# Patient Record
Sex: FEMALE | Race: WHITE | Employment: FULL TIME | URBAN - METROPOLITAN AREA
[De-identification: names, ages, dates, MRNs, and addresses within clinical notes are randomized per-mention and may not be internally consistent; named-entity substitution may affect disease eponyms.]

---

## 2017-09-26 ENCOUNTER — HOSPITAL ENCOUNTER (OUTPATIENT)
Dept: MAMMOGRAPHY | Age: 47
Discharge: HOME | End: 2017-09-26
Attending: NURSE PRACTITIONER | Admitting: NURSE PRACTITIONER

## 2017-09-26 DIAGNOSIS — Z12.31 ENCOUNTER FOR SCREENING MAMMOGRAM FOR MALIGNANT NEOPLASM OF BREAST: ICD-10-CM

## 2019-01-15 ENCOUNTER — HOSPITAL ENCOUNTER (OUTPATIENT)
Dept: WOMENS IMAGING | Age: 49
Discharge: HOME | End: 2019-01-15
Payer: COMMERCIAL

## 2019-01-15 DIAGNOSIS — Z12.31 VISIT FOR SCREENING MAMMOGRAM: ICD-10-CM

## 2019-01-15 PROCEDURE — 77067 SCR MAMMO BI INCL CAD: CPT

## 2019-05-28 ENCOUNTER — OFFICE VISIT (OUTPATIENT)
Dept: PRIMARY CARE CLINIC | Age: 49
End: 2019-05-28
Payer: COMMERCIAL

## 2019-05-28 VITALS
OXYGEN SATURATION: 98 % | BODY MASS INDEX: 29.89 KG/M2 | HEIGHT: 66 IN | HEART RATE: 66 BPM | SYSTOLIC BLOOD PRESSURE: 116 MMHG | WEIGHT: 186 LBS | DIASTOLIC BLOOD PRESSURE: 76 MMHG

## 2019-05-28 DIAGNOSIS — B00.1 HERPES LABIALIS WITHOUT COMPLICATION: Primary | ICD-10-CM

## 2019-05-28 PROCEDURE — 99213 OFFICE O/P EST LOW 20 MIN: CPT | Performed by: NURSE PRACTITIONER

## 2019-05-28 RX ORDER — VALACYCLOVIR HYDROCHLORIDE 1 G/1
2000 TABLET, FILM COATED ORAL 2 TIMES DAILY
Qty: 8 TABLET | Refills: 5 | Status: SHIPPED | OUTPATIENT
Start: 2019-05-28 | End: 2019-05-30

## 2019-05-28 ASSESSMENT — PATIENT HEALTH QUESTIONNAIRE - PHQ9
SUM OF ALL RESPONSES TO PHQ QUESTIONS 1-9: 0
SUM OF ALL RESPONSES TO PHQ9 QUESTIONS 1 & 2: 0
SUM OF ALL RESPONSES TO PHQ QUESTIONS 1-9: 0
2. FEELING DOWN, DEPRESSED OR HOPELESS: 0
1. LITTLE INTEREST OR PLEASURE IN DOING THINGS: 0

## 2019-05-28 NOTE — PROGRESS NOTES
Subjective:      Patient ID: Sly Cain is a 54 y.o. female. HPI 55 yo female presents to clinic for c/o frequent cold sores. Has previously taken lysine for this plus abreva and it would go away. Current symptoms have been going on for weeks. Typical treatment not working. Very dry. Tingling. Symptoms seem like they are improving then they return. Never has fully improved. Patient past medical history, family history, social, and smoking reviewed and updated as pertinent. Health maintenance has been reviewed. Prior to Visit Medications    Medication Sig Taking? Authorizing Provider   valACYclovir (VALTREX) 1 g tablet Take 2 tablets by mouth 2 times daily for 2 days Yes Mushtaq Langley, APRN - CNP   Glucosamine-Chondroit-Vit C-Mn (GLUCOSAMINE 1500 COMPLEX) CAPS Take 1 tablet by mouth Yes Historical Provider, MD   ascorbic acid (VITAMIN C) 500 MG tablet Take 500 mg by mouth daily Yes Historical Provider, MD   MAGNESIUM PO Take  by mouth daily. Yes Historical Provider, MD   Omega-3 Fatty Acids (FISH OIL PO) Take  by mouth daily. Yes Historical Provider, MD   Multiple Vitamins-Minerals (ZINC PO) Take  by mouth daily. Yes Historical Provider, MD   Multiple Vitamin (MULTIVITAMIN PO) Take  by mouth daily. Yes Historical Provider, MD   LYSINE PO Take  by mouth daily. Yes Historical Provider, MD         Review of Systems   Constitutional: Negative. HENT: Positive for mouth sores. All other systems reviewed and are negative. Objective:   Physical Exam   Constitutional: She appears well-developed and well-nourished. HENT:   Right Ear: External ear normal.   Left Ear: External ear normal.   Mouth/Throat: Oropharynx is clear and moist. Oral lesions present. No oropharyngeal exudate. Cardiovascular: Normal rate, regular rhythm and normal heart sounds. Pulmonary/Chest: Effort normal and breath sounds normal.   Skin: Skin is warm and dry. Assessment:       Diagnosis Orders   1.  Herpes labialis without complication             Plan:       Continue current therapy  Treat valtrex when flares  rtc to est new pcp        Gagan Lesch, APRN - CNP

## 2019-06-27 ENCOUNTER — OFFICE VISIT (OUTPATIENT)
Dept: PRIMARY CARE CLINIC | Age: 49
End: 2019-06-27
Payer: COMMERCIAL

## 2019-06-27 VITALS
WEIGHT: 189 LBS | SYSTOLIC BLOOD PRESSURE: 102 MMHG | RESPIRATION RATE: 16 BRPM | HEIGHT: 66 IN | HEART RATE: 72 BPM | OXYGEN SATURATION: 98 % | DIASTOLIC BLOOD PRESSURE: 70 MMHG | BODY MASS INDEX: 30.37 KG/M2

## 2019-06-27 DIAGNOSIS — Z00.00 ROUTINE GENERAL MEDICAL EXAMINATION AT A HEALTH CARE FACILITY: Primary | ICD-10-CM

## 2019-06-27 DIAGNOSIS — Z12.83 SKIN EXAM, SCREENING FOR CANCER: ICD-10-CM

## 2019-06-27 LAB
ANION GAP SERPL CALCULATED.3IONS-SCNC: 12 MMOL/L (ref 3–16)
BASOPHILS ABSOLUTE: 0 K/UL (ref 0–0.2)
BASOPHILS RELATIVE PERCENT: 0.8 %
BUN BLDV-MCNC: 12 MG/DL (ref 7–20)
CALCIUM SERPL-MCNC: 9 MG/DL (ref 8.3–10.6)
CHLORIDE BLD-SCNC: 105 MMOL/L (ref 99–110)
CHOLESTEROL, TOTAL: 217 MG/DL (ref 0–199)
CO2: 22 MMOL/L (ref 21–32)
CREAT SERPL-MCNC: 0.7 MG/DL (ref 0.6–1.1)
EOSINOPHILS ABSOLUTE: 0.2 K/UL (ref 0–0.6)
EOSINOPHILS RELATIVE PERCENT: 3.4 %
GFR AFRICAN AMERICAN: >60
GFR NON-AFRICAN AMERICAN: >60
GLUCOSE BLD-MCNC: 97 MG/DL (ref 70–99)
HCT VFR BLD CALC: 36 % (ref 36–48)
HDLC SERPL-MCNC: 92 MG/DL (ref 40–60)
HEMOGLOBIN: 11.9 G/DL (ref 12–16)
LDL CHOLESTEROL CALCULATED: 114 MG/DL
LYMPHOCYTES ABSOLUTE: 1.6 K/UL (ref 1–5.1)
LYMPHOCYTES RELATIVE PERCENT: 28.7 %
MCH RBC QN AUTO: 29.2 PG (ref 26–34)
MCHC RBC AUTO-ENTMCNC: 32.9 G/DL (ref 31–36)
MCV RBC AUTO: 88.7 FL (ref 80–100)
MONOCYTES ABSOLUTE: 0.4 K/UL (ref 0–1.3)
MONOCYTES RELATIVE PERCENT: 6.9 %
NEUTROPHILS ABSOLUTE: 3.3 K/UL (ref 1.7–7.7)
NEUTROPHILS RELATIVE PERCENT: 60.2 %
PDW BLD-RTO: 15.1 % (ref 12.4–15.4)
PLATELET # BLD: 281 K/UL (ref 135–450)
PMV BLD AUTO: 9.2 FL (ref 5–10.5)
POTASSIUM SERPL-SCNC: 4.4 MMOL/L (ref 3.5–5.1)
RBC # BLD: 4.06 M/UL (ref 4–5.2)
SODIUM BLD-SCNC: 139 MMOL/L (ref 136–145)
TRIGL SERPL-MCNC: 53 MG/DL (ref 0–150)
TSH REFLEX: 2.57 UIU/ML (ref 0.27–4.2)
VLDLC SERPL CALC-MCNC: 11 MG/DL
WBC # BLD: 5.5 K/UL (ref 4–11)

## 2019-06-27 PROCEDURE — 99396 PREV VISIT EST AGE 40-64: CPT | Performed by: NURSE PRACTITIONER

## 2019-06-27 NOTE — PROGRESS NOTES
Subjective:      Patient ID: Nissa Shah is a 54 y.o. female. HPI  53 yo female here for annual. Would like referral to derm for skin check    Patient past medical history, family history, social, and smoking reviewed and updated as pertinent. Health maintenance has been reviewed. Prior to Visit Medications    Medication Sig Taking? Authorizing Provider   Glucosamine-Chondroit-Vit C-Mn (GLUCOSAMINE 1500 COMPLEX) CAPS Take 1 tablet by mouth Yes Historical Provider, MD   ascorbic acid (VITAMIN C) 500 MG tablet Take 500 mg by mouth daily Yes Historical Provider, MD   MAGNESIUM PO Take  by mouth daily. Yes Historical Provider, MD   Omega-3 Fatty Acids (FISH OIL PO) Take  by mouth daily. Yes Historical Provider, MD   Multiple Vitamins-Minerals (ZINC PO) Take  by mouth daily. Yes Historical Provider, MD   Multiple Vitamin (MULTIVITAMIN PO) Take  by mouth daily. Yes Historical Provider, MD   LYSINE PO Take  by mouth daily. Yes Historical Provider, MD       Review of Systems   Constitutional: Negative. Skin: Negative. All other systems reviewed and are negative. Objective:   Physical Exam   Constitutional: She is oriented to person, place, and time. Vital signs are normal. She appears well-developed and well-nourished. HENT:   Head: Normocephalic. Right Ear: Hearing, tympanic membrane, external ear and ear canal normal.   Left Ear: Hearing, tympanic membrane, external ear and ear canal normal.   Nose: Nose normal. Right sinus exhibits no maxillary sinus tenderness and no frontal sinus tenderness. Left sinus exhibits no maxillary sinus tenderness and no frontal sinus tenderness. Mouth/Throat: Oropharynx is clear and moist and mucous membranes are normal.   Eyes: Pupils are equal, round, and reactive to light. Conjunctivae, EOM and lids are normal.   Neck: Trachea normal, normal range of motion and full passive range of motion without pain.    Cardiovascular: Normal rate, regular rhythm, S1 normal, S2 normal, normal heart sounds, intact distal pulses and normal pulses. Pulmonary/Chest: Effort normal and breath sounds normal.   Abdominal: Soft. Normal appearance and bowel sounds are normal. There is no tenderness. Musculoskeletal: Normal range of motion. Lymphadenopathy:     She has no cervical adenopathy. She has no axillary adenopathy. Neurological: She is alert and oriented to person, place, and time. She has normal strength and normal reflexes. Skin: Skin is warm, dry and intact. Psychiatric: She has a normal mood and affect. Her speech is normal and behavior is normal. Judgment and thought content normal.       Assessment:       Diagnosis Orders   1. Routine general medical examination at a health care facility  Basic Metabolic Panel    CBC Auto Differential    TSH with Reflex    Lipid Panel   2.  Skin exam, screening for cancer  Raffi Conte MD, Dermatology, [facility]           Plan:      Doing well  Labs as above  Refer derm skin check  rtc 1 year cpe        Carmelo Jones, YUMIKO - CNP

## 2020-01-29 ENCOUNTER — OFFICE VISIT (OUTPATIENT)
Dept: PRIMARY CARE CLINIC | Age: 50
End: 2020-01-29
Payer: COMMERCIAL

## 2020-01-29 VITALS
DIASTOLIC BLOOD PRESSURE: 72 MMHG | SYSTOLIC BLOOD PRESSURE: 118 MMHG | BODY MASS INDEX: 32.69 KG/M2 | HEIGHT: 65 IN | TEMPERATURE: 98.5 F | HEART RATE: 64 BPM | WEIGHT: 196.2 LBS

## 2020-01-29 PROCEDURE — 90471 IMMUNIZATION ADMIN: CPT | Performed by: INTERNAL MEDICINE

## 2020-01-29 PROCEDURE — 90715 TDAP VACCINE 7 YRS/> IM: CPT | Performed by: INTERNAL MEDICINE

## 2020-01-29 PROCEDURE — 99386 PREV VISIT NEW AGE 40-64: CPT | Performed by: INTERNAL MEDICINE

## 2020-01-29 ASSESSMENT — PATIENT HEALTH QUESTIONNAIRE - PHQ9
SUM OF ALL RESPONSES TO PHQ QUESTIONS 1-9: 0
SUM OF ALL RESPONSES TO PHQ QUESTIONS 1-9: 0
SUM OF ALL RESPONSES TO PHQ9 QUESTIONS 1 & 2: 0
2. FEELING DOWN, DEPRESSED OR HOPELESS: 0
1. LITTLE INTEREST OR PLEASURE IN DOING THINGS: 0

## 2020-01-29 NOTE — PROGRESS NOTES
Ludmila Conner  YOB: 1970    Date of Service:  1/29/2020    Chief Complaint:   Ludmila Conner is a 54 y.o. female who presents for physical exam      HPI: New patient :patient came here for physical exam and establishing care. Patient is updated on flu vaccine. Review of Systems:  Constitutional: Negative for chills, fever, malaise/fatigue and weight loss. HENT: Negative for headaches, ear discharge, ear pain, hearing loss, sore throat, blurry vision and double vision. Respiratory: Negative for cough,  sputum production, hemoptysis, shortness of breath and wheezing. Cardiovascular: Negative for chest pain, palpitations, orthopnea, claudication and leg swelling. Gastrointestinal: Negative for abdominal pain, nausea and vomiting, constipation, diarrhea, heartburn, blood in stool, melena. Genitourinary: Negative for dysuria, flank pain, frequency, hematuria and urgency. Musculoskeletal: Negative for back pain, myalgias and neck pain. Knee pain. Shoulder pain. Neurological: Negative for dizziness, seizure, sensory change, focal weakness, loss of consciousness . Psychiatric/Behavioral: Negative for depression and substance abuse. The patient does not have insomnia. Skin: Rash, skin lesion, itching, acne, numerous moles. Vitals:    01/29/20 1502   BP: 118/72   Pulse: 64   Temp: 98.5 °F (36.9 °C)     Wt Readings from Last 3 Encounters:   01/29/20 196 lb 3.2 oz (89 kg)   06/27/19 189 lb (85.7 kg)   05/28/19 186 lb (84.4 kg)     BP Readings from Last 3 Encounters:   01/29/20 118/72   06/27/19 102/70   05/28/19 116/76     PHQ Scores 1/29/2020 5/28/2019   PHQ2 Score 0 0   PHQ9 Score 0 0          Physical Exam:  Constitutional:   appears well-nourished. No distress. HENT:   Head: Normocephalic. Right Ear: External ear normal.   Left Ear: External ear normal.   Mouth/Throat: Oropharynx is clear and moist. No oropharyngeal exudate.      Eyes: Conjunctivae and EOM are normal. Pupils are equal, round, and reactive to light. Right eye exhibits no discharge. Left eye exhibits no discharge. No scleral icterus. Neck: Neck supple. No JVD present. No thyromegaly present. Cardiovascular:  Normal rate, regular rhythm, normal heart sounds and intact distal pulses. Exam reveals no gallop. No murmur heard. no edema. Chest: Effort normal and breath sounds normal. no wheezes. has no rales. Abdominal: Soft, Bowel sounds are normal.  exhibits no distension and no mass. No organomegaly  There is no tenderness. There is no guarding. Musculoskeletal: Normal range of motion in all extremities. Lymphadenopathy: No cervical adenopathy. Neurological:  exhibits normal muscle tone. No sensory or motor deficit, Coordination normal, normal reflexes. Skin: Skin is warm. No rash . No Erythema. Psychiatric:  alert and oriented to person, place, and time. Normal mood and affect. Immunization History   Administered Date(s) Administered    Influenza Vaccine, unspecified formulation 09/01/2016    Influenza, Quadv, Recombinant, IM PF (Flublok 18 yrs and older) 10/11/2019    Tdap (Boostrix, Adacel) 11/19/2009, 01/29/2020       No Known Allergies  Current Outpatient Medications   Medication Sig Dispense Refill    Glucosamine-Chondroit-Vit C-Mn (GLUCOSAMINE 1500 COMPLEX) CAPS Take 1 tablet by mouth      ascorbic acid (VITAMIN C) 500 MG tablet Take 500 mg by mouth daily      MAGNESIUM PO Take  by mouth daily.  Omega-3 Fatty Acids (FISH OIL PO) Take  by mouth daily.  Multiple Vitamin (MULTIVITAMIN PO) Take  by mouth daily.  LYSINE PO Take  by mouth daily.  Multiple Vitamins-Minerals (ZINC PO) Take  by mouth daily. No current facility-administered medications for this visit.         Past Medical History:   Diagnosis Date    CTS (carpal tunnel syndrome)     Low back pain     Lumbar strain     Plantar warts      Past Surgical History:   Procedure Laterality Date    BUNIONECTOMY  1/30/12    left    WISDOM TOOTH EXTRACTION       No family history on file. Social History     Socioeconomic History    Marital status:      Spouse name: Not on file    Number of children: Not on file    Years of education: Not on file    Highest education level: Not on file   Occupational History    Not on file   Social Needs    Financial resource strain: Not on file    Food insecurity:     Worry: Not on file     Inability: Not on file    Transportation needs:     Medical: Not on file     Non-medical: Not on file   Tobacco Use    Smoking status: Former Smoker     Packs/day: 0.50     Years: 15.00     Pack years: 7.50     Last attempt to quit: 1/24/2005     Years since quitting: 15.0    Smokeless tobacco: Never Used   Substance and Sexual Activity    Alcohol use: Yes     Comment: 2 X per month    Drug use: No    Sexual activity: Not on file   Lifestyle    Physical activity:     Days per week: Not on file     Minutes per session: Not on file    Stress: Not on file   Relationships    Social connections:     Talks on phone: Not on file     Gets together: Not on file     Attends Protestant service: Not on file     Active member of club or organization: Not on file     Attends meetings of clubs or organizations: Not on file     Relationship status: Not on file    Intimate partner violence:     Fear of current or ex partner: Not on file     Emotionally abused: Not on file     Physically abused: Not on file     Forced sexual activity: Not on file   Other Topics Concern    Not on file   Social History Narrative    Not on file       Assessment/Plan:    1. Physical exam  - Basic Metabolic Panel; Future  - TSH without Reflex; Future  - Vitamin D 25 Hydroxy; Future  - Lipid Panel;  Future  - CBC Auto Differential; Future  Healthy diet and regular exercise      Preventive Care:  PAP: She is due she wants to take on next visit  Colonoscopy : N/A  Vaccine: Patient is updated with flu vaccine  Need for Tdap vaccination  - Tdap (age 6y and older) IM ([address])

## 2020-01-30 DIAGNOSIS — Z00.00 PHYSICAL EXAM: ICD-10-CM

## 2020-01-30 LAB
ANION GAP SERPL CALCULATED.3IONS-SCNC: 12 MMOL/L (ref 3–16)
BASOPHILS ABSOLUTE: 0 K/UL (ref 0–0.2)
BASOPHILS RELATIVE PERCENT: 0.8 %
BUN BLDV-MCNC: 12 MG/DL (ref 7–20)
CALCIUM SERPL-MCNC: 9 MG/DL (ref 8.3–10.6)
CHLORIDE BLD-SCNC: 106 MMOL/L (ref 99–110)
CHOLESTEROL, TOTAL: 206 MG/DL (ref 0–199)
CO2: 24 MMOL/L (ref 21–32)
CREAT SERPL-MCNC: 0.6 MG/DL (ref 0.6–1.1)
EOSINOPHILS ABSOLUTE: 0.2 K/UL (ref 0–0.6)
EOSINOPHILS RELATIVE PERCENT: 3.5 %
GFR AFRICAN AMERICAN: >60
GFR NON-AFRICAN AMERICAN: >60
GLUCOSE BLD-MCNC: 93 MG/DL (ref 70–99)
HCT VFR BLD CALC: 38.1 % (ref 36–48)
HDLC SERPL-MCNC: 86 MG/DL (ref 40–60)
HEMOGLOBIN: 12.8 G/DL (ref 12–16)
LDL CHOLESTEROL CALCULATED: 107 MG/DL
LYMPHOCYTES ABSOLUTE: 1.5 K/UL (ref 1–5.1)
LYMPHOCYTES RELATIVE PERCENT: 26.4 %
MCH RBC QN AUTO: 30.7 PG (ref 26–34)
MCHC RBC AUTO-ENTMCNC: 33.6 G/DL (ref 31–36)
MCV RBC AUTO: 91.4 FL (ref 80–100)
MONOCYTES ABSOLUTE: 0.4 K/UL (ref 0–1.3)
MONOCYTES RELATIVE PERCENT: 7.3 %
NEUTROPHILS ABSOLUTE: 3.4 K/UL (ref 1.7–7.7)
NEUTROPHILS RELATIVE PERCENT: 62 %
PDW BLD-RTO: 14.1 % (ref 12.4–15.4)
PLATELET # BLD: 263 K/UL (ref 135–450)
PMV BLD AUTO: 9.2 FL (ref 5–10.5)
POTASSIUM SERPL-SCNC: 4.4 MMOL/L (ref 3.5–5.1)
RBC # BLD: 4.17 M/UL (ref 4–5.2)
SODIUM BLD-SCNC: 142 MMOL/L (ref 136–145)
TRIGL SERPL-MCNC: 65 MG/DL (ref 0–150)
TSH SERPL DL<=0.05 MIU/L-ACNC: 3.19 UIU/ML (ref 0.27–4.2)
VITAMIN D 25-HYDROXY: 29.4 NG/ML
VLDLC SERPL CALC-MCNC: 13 MG/DL
WBC # BLD: 5.5 K/UL (ref 4–11)

## 2020-10-02 ENCOUNTER — OFFICE VISIT (OUTPATIENT)
Dept: PRIMARY CARE CLINIC | Age: 50
End: 2020-10-02
Payer: COMMERCIAL

## 2020-10-02 VITALS
BODY MASS INDEX: 32.39 KG/M2 | OXYGEN SATURATION: 100 % | TEMPERATURE: 98.2 F | SYSTOLIC BLOOD PRESSURE: 104 MMHG | WEIGHT: 194.4 LBS | DIASTOLIC BLOOD PRESSURE: 58 MMHG | HEIGHT: 65 IN | HEART RATE: 75 BPM

## 2020-10-02 PROCEDURE — 99213 OFFICE O/P EST LOW 20 MIN: CPT | Performed by: FAMILY MEDICINE

## 2020-10-02 ASSESSMENT — ENCOUNTER SYMPTOMS
VOMITING: 0
SHORTNESS OF BREATH: 0
DIARRHEA: 0
ABDOMINAL PAIN: 0
NAUSEA: 0

## 2020-10-02 NOTE — PROGRESS NOTES
[address] PRIMARY CARE  [address] [place] [id number]  Dept: [phone]  Dept Fax: [phone]     10/2/2020      Ziggy Cleaning   1970     Chief Complaint   Patient presents with    Mass     lump under right arm. HPI  Pt comes in today to establish care. She has no PMH. Had a small bump under right axilla that she thinks has since gone away. Last mammo 1/2019. Fasting labs done in 1/2020 normal. Cholesterol mildly elevated, but HDL high. No other acute concerns. UTD on pap. Due 8/2021. UTD on flu vaccine. Prior to Visit Medications    Medication Sig Taking? Authorizing Provider   Glucosamine-Chondroit-Vit C-Mn (GLUCOSAMINE 1500 COMPLEX) CAPS Take 1 tablet by mouth Yes Historical Provider, MD   MAGNESIUM PO Take  by mouth daily. Yes Historical Provider, MD   Multiple Vitamin (MULTIVITAMIN PO) Take  by mouth daily. Yes Historical Provider, MD   LYSINE PO Take  by mouth daily. Yes Historical Provider, MD        Past Medical History:   Diagnosis Date    CTS (carpal tunnel syndrome)     Low back pain         Social History     Tobacco Use    Smoking status: Former Smoker     Packs/day: 0.50     Years: 15.00     Pack years: 7.50     Last attempt to quit: 1/24/2005     Years since quitting: 15.6    Smokeless tobacco: Never Used   Substance Use Topics    Alcohol use: Yes     Comment: 2 X per month    Drug use: No        Past Surgical History:   Procedure Laterality Date    BUNIONECTOMY  1/30/12    left    WISDOM TOOTH EXTRACTION          No Known Allergies     Family History   Problem Relation Age of Onset    High Blood Pressure Mother     High Cholesterol Mother     Cancer Father 68        Lung cancer        Patient's past medical history, surgical history, family history, medications, and allergies  were all reviewed and updated as appropriate today.     Review of Systems   Constitutional: Negative for chills, fever and unexpected weight Assessment:  Encounter Diagnoses   Name Primary?  Axillary lump, right Yes    Encounter for screening mammogram for malignant neoplasm of breast        Plan:    - No palpable lump on exam. Resolved. - Due for screening. Mammo ordered. - Return in 1 year for physical/pap. New Prescriptions    No medications on file        Orders Placed This Encounter   Procedures    CAREY VIC DIGITAL SCREEN BILATERAL        Return in about 1 year (around 10/2/2021) for Physical with pap - fasting labs.           [address], DO

## 2020-10-23 ENCOUNTER — HOSPITAL ENCOUNTER (OUTPATIENT)
Dept: WOMENS IMAGING | Age: 50
Discharge: HOME | End: 2020-10-23
Payer: COMMERCIAL

## 2020-10-23 PROCEDURE — 77063 BREAST TOMOSYNTHESIS BI: CPT

## 2020-10-27 ENCOUNTER — TELEPHONE (OUTPATIENT)
Dept: WOMENS IMAGING | Age: 50
End: 2020-10-27

## 2020-10-30 ENCOUNTER — HOSPITAL ENCOUNTER (OUTPATIENT)
Dept: WOMENS IMAGING | Age: 50
Discharge: HOME | End: 2020-10-30
Payer: COMMERCIAL

## 2020-10-30 ENCOUNTER — HOSPITAL ENCOUNTER (OUTPATIENT)
Dept: WOMENS IMAGING | Age: 50
End: 2020-10-30
Payer: COMMERCIAL

## 2020-10-30 PROCEDURE — G0279 TOMOSYNTHESIS, MAMMO: HCPCS

## 2022-01-04 ENCOUNTER — OFFICE VISIT (OUTPATIENT)
Dept: PRIMARY CARE CLINIC | Age: 52
End: 2022-01-04
Payer: COMMERCIAL

## 2022-01-04 VITALS
BODY MASS INDEX: 32.82 KG/M2 | OXYGEN SATURATION: 97 % | SYSTOLIC BLOOD PRESSURE: 110 MMHG | TEMPERATURE: 97.9 F | WEIGHT: 197 LBS | HEIGHT: 65 IN | DIASTOLIC BLOOD PRESSURE: 73 MMHG | HEART RATE: 75 BPM

## 2022-01-04 DIAGNOSIS — M20.11 HALLUX VALGUS, RIGHT: Primary | ICD-10-CM

## 2022-01-04 DIAGNOSIS — Z12.31 ENCOUNTER FOR SCREENING MAMMOGRAM FOR MALIGNANT NEOPLASM OF BREAST: ICD-10-CM

## 2022-01-04 DIAGNOSIS — Z12.11 SCREENING FOR COLON CANCER: ICD-10-CM

## 2022-01-04 DIAGNOSIS — Z01.818 PRE-OP EXAM: ICD-10-CM

## 2022-01-04 PROCEDURE — 1036F TOBACCO NON-USER: CPT | Performed by: FAMILY MEDICINE

## 2022-01-04 PROCEDURE — G8427 DOCREV CUR MEDS BY ELIG CLIN: HCPCS | Performed by: FAMILY MEDICINE

## 2022-01-04 PROCEDURE — 99213 OFFICE O/P EST LOW 20 MIN: CPT | Performed by: FAMILY MEDICINE

## 2022-01-04 PROCEDURE — G8482 FLU IMMUNIZE ORDER/ADMIN: HCPCS | Performed by: FAMILY MEDICINE

## 2022-01-04 PROCEDURE — G8419 CALC BMI OUT NRM PARAM NOF/U: HCPCS | Performed by: FAMILY MEDICINE

## 2022-01-04 ASSESSMENT — ENCOUNTER SYMPTOMS
ABDOMINAL PAIN: 0
VOMITING: 0
SHORTNESS OF BREATH: 0
NAUSEA: 0
DIARRHEA: 0

## 2022-01-04 ASSESSMENT — PATIENT HEALTH QUESTIONNAIRE - PHQ9
SUM OF ALL RESPONSES TO PHQ QUESTIONS 1-9: 0
2. FEELING DOWN, DEPRESSED OR HOPELESS: 0
1. LITTLE INTEREST OR PLEASURE IN DOING THINGS: 0
SUM OF ALL RESPONSES TO PHQ9 QUESTIONS 1 & 2: 0

## 2022-01-04 NOTE — PROGRESS NOTES
[address] [facility] [address] PRIMARY CARE  [address] [place] [id number]  Dept: [phone]  Dept Fax: [phone]     2022      Dannielle De La Rosa   1970     Chief Complaint   Patient presents with    Pre-op Exam       HPI      Patient is here today for pre-op exam.     Diagnosis: Right hallux valgus    Planned surgery: Bunionectomy    Date scheduled: 21    Anesthesia: General    Blood thinner/ASA/NSAIDs: None    H/o anesthesia complications: None    Pre-operative testing: None ordered from surgeon. H/o cardiac or pulmonary disease: None. Functional capacity: 4-10  ( <4 MET = Self care, ADLs, walking indoors or 1-2 blocks on level ground, 4-10 METs = light/moderate housework, climb flight of stairs, running a short distance, > 10 METs = strenuous sports)        PHQ-9 Total Score: 0 (2022 11:44 AM)       Prior to Visit Medications    Medication Sig Taking? Authorizing Provider   Glucosamine-Chondroit-Vit C-Mn (GLUCOSAMINE 1500 COMPLEX) CAPS Take 1 tablet by mouth Yes Historical Provider, MD   MAGNESIUM PO Take  by mouth daily. Yes Historical Provider, MD   Multiple Vitamin (MULTIVITAMIN PO) Take  by mouth daily. Yes Historical Provider, MD   LYSINE PO Take  by mouth daily.    Yes Historical Provider, MD        Past Medical History:   Diagnosis Date    CTS (carpal tunnel syndrome)     Low back pain         Social History     Tobacco Use    Smoking status: Former Smoker     Packs/day: 0.50     Years: 15.00     Pack years: 7.50     Quit date: 2005     Years since quittin.9    Smokeless tobacco: Never Used   Vaping Use    Vaping Use: Never used   Substance Use Topics    Alcohol use: Yes     Comment: 2 X per month    Drug use: No        Past Surgical History:   Procedure Laterality Date    BUNIONECTOMY  12    left    WISDOM TOOTH EXTRACTION          No Known Allergies     Family History   Problem Relation Age of Onset    High Blood Pressure Mother     High Cholesterol Mother     Cancer Father 68        Lung cancer        Patient's past medical history, surgical history, family history, medications, and allergies  were all reviewed and updated as appropriate today. Review of Systems   Constitutional: Negative for chills, fever and unexpected weight change. Respiratory: Negative for shortness of breath. Cardiovascular: Negative for chest pain. Gastrointestinal: Negative for abdominal pain, diarrhea, nausea and vomiting. /73   Pulse 75   Temp 97.9 °F (36.6 °C)   Ht 5' 5\" (1.651 m)   Wt 197 lb (89.4 kg)   LMP 01/03/2022 (Exact Date)   SpO2 97%   BMI 32.78 kg/m²      Physical Exam  Vitals reviewed. Constitutional:       General: She is not in acute distress. Appearance: Normal appearance. She is well-developed. HENT:      Head: Normocephalic. Eyes:      General: No scleral icterus. Conjunctiva/sclera: Conjunctivae normal.   Neck:      Thyroid: No thyromegaly. Cardiovascular:      Rate and Rhythm: Normal rate and regular rhythm. Heart sounds: No murmur heard. Pulmonary:      Effort: Pulmonary effort is normal. No respiratory distress. Breath sounds: Normal breath sounds. Abdominal:      General: Bowel sounds are normal. There is no distension. Palpations: Abdomen is soft. Tenderness: There is no abdominal tenderness. Musculoskeletal:      Cervical back: Neck supple. Right lower leg: No edema. Left lower leg: No edema. Lymphadenopathy:      Cervical: No cervical adenopathy. Skin:     General: Skin is warm and dry. Capillary Refill: Capillary refill takes less than 2 seconds. Neurological:      General: No focal deficit present. Mental Status: She is alert and oriented to person, place, and time. Mental status is at baseline. Psychiatric:         Mood and Affect: Mood normal.         Assessment:  Encounter Diagnoses   Name Primary?     Hallux biju, right Yes    Pre-op exam     Encounter for screening mammogram for malignant neoplasm of breast     Screening for colon cancer        Plan:    - Low risk to proceed with planned surgery. - Discussed breast/colon cancer screening. Orders provided. New Prescriptions    No medications on file        Orders Placed This Encounter   Procedures   Romi Fernando MD (Colonoscopy), General Surgery, [facility]        Return if symptoms worsen or fail to improve. 20 total minutes were spent in direct patient care including chart review, face-to-face consultation and documentation.      [address], DO

## 2022-01-26 ENCOUNTER — TELEPHONE (OUTPATIENT)
Dept: PRIMARY CARE CLINIC | Age: 52
End: 2022-01-26

## 2022-01-26 NOTE — TELEPHONE ENCOUNTER
----- Message from Amy Ortiz sent at 1/26/2022 10:16 AM EST -----  Subject: Message to Provider    QUESTIONS  Information for Provider? airam--- 60.00 for office visit 40.00 co   pay for office visit talked to billing, billing said to contact office--   patient has Daniel Sinclair  ---------------------------------------------------------------------------  --------------  [address]  What is the best way for the office to contact you? OK to leave message on   voicemail  Preferred Call Back Phone Number? [phone]  ---------------------------------------------------------------------------  --------------  SCRIPT ANSWERS  Relationship to Patient?  Self

## 2022-01-27 ENCOUNTER — TELEPHONE (OUTPATIENT)
Dept: PRIMARY CARE CLINIC | Age: 52
End: 2022-01-27

## 2022-01-30 ENCOUNTER — APPOINTMENT (OUTPATIENT)
Dept: GENERAL RADIOLOGY | Age: 52
DRG: 176 | End: 2022-01-30
Payer: COMMERCIAL

## 2022-01-30 ENCOUNTER — APPOINTMENT (OUTPATIENT)
Dept: CT IMAGING | Age: 52
DRG: 176 | End: 2022-01-30
Payer: COMMERCIAL

## 2022-01-30 ENCOUNTER — HOSPITAL ENCOUNTER (INPATIENT)
Age: 52
LOS: 1 days | Discharge: HOME | DRG: 176 | End: 2022-01-31
Attending: EMERGENCY MEDICINE | Admitting: INTERNAL MEDICINE
Payer: COMMERCIAL

## 2022-01-30 DIAGNOSIS — J96.01 ACUTE RESPIRATORY FAILURE WITH HYPOXIA (HCC): Primary | ICD-10-CM

## 2022-01-30 DIAGNOSIS — I26.94 MULTIPLE SUBSEGMENTAL PULMONARY EMBOLI WITHOUT ACUTE COR PULMONALE (HCC): ICD-10-CM

## 2022-01-30 DIAGNOSIS — R07.9 CHEST PAIN, UNSPECIFIED TYPE: ICD-10-CM

## 2022-01-30 DIAGNOSIS — R06.02 SHORTNESS OF BREATH: ICD-10-CM

## 2022-01-30 DIAGNOSIS — J18.9 PNEUMONIA OF BOTH LUNGS DUE TO INFECTIOUS ORGANISM, UNSPECIFIED PART OF LUNG: ICD-10-CM

## 2022-01-30 PROBLEM — I26.99 BILATERAL PULMONARY EMBOLISM (HCC): Status: ACTIVE | Noted: 2022-01-30

## 2022-01-30 LAB
A/G RATIO: 1.1 (ref 1.1–2.2)
ALBUMIN SERPL-MCNC: 4 G/DL (ref 3.4–5)
ALP BLD-CCNC: 76 U/L (ref 40–129)
ALT SERPL-CCNC: 9 U/L (ref 10–40)
ANION GAP SERPL CALCULATED.3IONS-SCNC: 12 MMOL/L (ref 3–16)
AST SERPL-CCNC: 14 U/L (ref 15–37)
BASOPHILS ABSOLUTE: 0.1 K/UL (ref 0–0.2)
BASOPHILS RELATIVE PERCENT: 0.8 %
BILIRUB SERPL-MCNC: 0.3 MG/DL (ref 0–1)
BUN BLDV-MCNC: 15 MG/DL (ref 7–20)
CALCIUM SERPL-MCNC: 9.4 MG/DL (ref 8.3–10.6)
CHLORIDE BLD-SCNC: 102 MMOL/L (ref 99–110)
CO2: 24 MMOL/L (ref 21–32)
CREAT SERPL-MCNC: 0.7 MG/DL (ref 0.6–1.1)
D DIMER: 1112 NG/ML DDU (ref 0–229)
EOSINOPHILS ABSOLUTE: 0.2 K/UL (ref 0–0.6)
EOSINOPHILS RELATIVE PERCENT: 1.7 %
GFR AFRICAN AMERICAN: >60
GFR NON-AFRICAN AMERICAN: >60
GLUCOSE BLD-MCNC: 167 MG/DL (ref 70–99)
HCG QUALITATIVE: NEGATIVE
HCT VFR BLD CALC: 37.7 % (ref 36–48)
HEMOGLOBIN: 12.6 G/DL (ref 12–16)
LYMPHOCYTES ABSOLUTE: 1.2 K/UL (ref 1–5.1)
LYMPHOCYTES RELATIVE PERCENT: 9.9 %
MCH RBC QN AUTO: 29.7 PG (ref 26–34)
MCHC RBC AUTO-ENTMCNC: 33.5 G/DL (ref 31–36)
MCV RBC AUTO: 88.9 FL (ref 80–100)
MONOCYTES ABSOLUTE: 0.8 K/UL (ref 0–1.3)
MONOCYTES RELATIVE PERCENT: 6.4 %
NEUTROPHILS ABSOLUTE: 9.7 K/UL (ref 1.7–7.7)
NEUTROPHILS RELATIVE PERCENT: 81.2 %
PDW BLD-RTO: 14.3 % (ref 12.4–15.4)
PLATELET # BLD: 272 K/UL (ref 135–450)
PMV BLD AUTO: 8.5 FL (ref 5–10.5)
POTASSIUM REFLEX MAGNESIUM: 3.9 MMOL/L (ref 3.5–5.1)
PROCALCITONIN: 0.13 NG/ML (ref 0–0.15)
RBC # BLD: 4.25 M/UL (ref 4–5.2)
SARS-COV-2, NAAT: NOT DETECTED
SODIUM BLD-SCNC: 138 MMOL/L (ref 136–145)
TOTAL PROTEIN: 7.7 G/DL (ref 6.4–8.2)
TROPONIN: <0.01 NG/ML
WBC # BLD: 11.9 K/UL (ref 4–11)

## 2022-01-30 PROCEDURE — 71046 X-RAY EXAM CHEST 2 VIEWS: CPT

## 2022-01-30 PROCEDURE — 84484 ASSAY OF TROPONIN QUANT: CPT

## 2022-01-30 PROCEDURE — 85379 FIBRIN DEGRADATION QUANT: CPT

## 2022-01-30 PROCEDURE — 36415 COLL VENOUS BLD VENIPUNCTURE: CPT

## 2022-01-30 PROCEDURE — 2580000003 HC RX 258: Performed by: EMERGENCY MEDICINE

## 2022-01-30 PROCEDURE — 87635 SARS-COV-2 COVID-19 AMP PRB: CPT

## 2022-01-30 PROCEDURE — 85025 COMPLETE CBC W/AUTO DIFF WBC: CPT

## 2022-01-30 PROCEDURE — 96372 THER/PROPH/DIAG INJ SC/IM: CPT

## 2022-01-30 PROCEDURE — 80053 COMPREHEN METABOLIC PANEL: CPT

## 2022-01-30 PROCEDURE — 6360000004 HC RX CONTRAST MEDICATION: Performed by: EMERGENCY MEDICINE

## 2022-01-30 PROCEDURE — 84145 PROCALCITONIN (PCT): CPT

## 2022-01-30 PROCEDURE — 93005 ELECTROCARDIOGRAM TRACING: CPT | Performed by: EMERGENCY MEDICINE

## 2022-01-30 PROCEDURE — 2580000003 HC RX 258: Performed by: INTERNAL MEDICINE

## 2022-01-30 PROCEDURE — 6370000000 HC RX 637 (ALT 250 FOR IP): Performed by: INTERNAL MEDICINE

## 2022-01-30 PROCEDURE — 99284 EMERGENCY DEPT VISIT MOD MDM: CPT

## 2022-01-30 PROCEDURE — 2060000000 HC ICU INTERMEDIATE R&B

## 2022-01-30 PROCEDURE — 71260 CT THORAX DX C+: CPT

## 2022-01-30 PROCEDURE — 84703 CHORIONIC GONADOTROPIN ASSAY: CPT

## 2022-01-30 PROCEDURE — 6360000002 HC RX W HCPCS: Performed by: EMERGENCY MEDICINE

## 2022-01-30 RX ORDER — ACETAMINOPHEN 325 MG/1
650 TABLET ORAL EVERY 6 HOURS PRN
Status: DISCONTINUED | OUTPATIENT
Start: 2022-01-30 | End: 2022-01-31 | Stop reason: HOSPADM

## 2022-01-30 RX ORDER — ACETAMINOPHEN 650 MG/1
650 SUPPOSITORY RECTAL EVERY 6 HOURS PRN
Status: DISCONTINUED | OUTPATIENT
Start: 2022-01-30 | End: 2022-01-31 | Stop reason: HOSPADM

## 2022-01-30 RX ORDER — ONDANSETRON 2 MG/ML
4 INJECTION INTRAMUSCULAR; INTRAVENOUS EVERY 6 HOURS PRN
Status: DISCONTINUED | OUTPATIENT
Start: 2022-01-30 | End: 2022-01-31 | Stop reason: HOSPADM

## 2022-01-30 RX ORDER — SODIUM CHLORIDE 0.9 % (FLUSH) 0.9 %
10 SYRINGE (ML) INJECTION PRN
Status: DISCONTINUED | OUTPATIENT
Start: 2022-01-30 | End: 2022-01-31 | Stop reason: HOSPADM

## 2022-01-30 RX ORDER — KETOROLAC TROMETHAMINE 30 MG/ML
30 INJECTION, SOLUTION INTRAMUSCULAR; INTRAVENOUS ONCE
Status: DISCONTINUED | OUTPATIENT
Start: 2022-01-30 | End: 2022-01-30

## 2022-01-30 RX ORDER — BISMUTH SUBSALICYLATE 262 MG/1
524 TABLET, CHEWABLE ORAL
Status: DISCONTINUED | OUTPATIENT
Start: 2022-01-30 | End: 2022-01-31 | Stop reason: HOSPADM

## 2022-01-30 RX ORDER — SENNA AND DOCUSATE SODIUM 50; 8.6 MG/1; MG/1
1 TABLET, FILM COATED ORAL 2 TIMES DAILY
Status: DISCONTINUED | OUTPATIENT
Start: 2022-01-30 | End: 2022-01-31 | Stop reason: HOSPADM

## 2022-01-30 RX ORDER — ONDANSETRON 4 MG/1
4 TABLET, ORALLY DISINTEGRATING ORAL EVERY 8 HOURS PRN
Status: DISCONTINUED | OUTPATIENT
Start: 2022-01-30 | End: 2022-01-31 | Stop reason: HOSPADM

## 2022-01-30 RX ORDER — SODIUM CHLORIDE 0.9 % (FLUSH) 0.9 %
10 SYRINGE (ML) INJECTION EVERY 12 HOURS SCHEDULED
Status: DISCONTINUED | OUTPATIENT
Start: 2022-01-30 | End: 2022-01-31 | Stop reason: HOSPADM

## 2022-01-30 RX ORDER — SODIUM CHLORIDE 9 MG/ML
25 INJECTION, SOLUTION INTRAVENOUS PRN
Status: DISCONTINUED | OUTPATIENT
Start: 2022-01-30 | End: 2022-01-31 | Stop reason: HOSPADM

## 2022-01-30 RX ORDER — SODIUM CHLORIDE 9 MG/ML
1000 INJECTION, SOLUTION INTRAVENOUS CONTINUOUS
Status: DISCONTINUED | OUTPATIENT
Start: 2022-01-30 | End: 2022-01-30

## 2022-01-30 RX ADMIN — SODIUM CHLORIDE 1000 ML: 9 INJECTION, SOLUTION INTRAVENOUS at 06:42

## 2022-01-30 RX ADMIN — IOPAMIDOL 80 ML: 755 INJECTION, SOLUTION INTRAVENOUS at 05:14

## 2022-01-30 RX ADMIN — ACETAMINOPHEN 650 MG: 325 TABLET ORAL at 17:18

## 2022-01-30 RX ADMIN — CEFTRIAXONE 1000 MG: 1 INJECTION, POWDER, FOR SOLUTION INTRAMUSCULAR; INTRAVENOUS at 08:40

## 2022-01-30 RX ADMIN — ENOXAPARIN SODIUM 120 MG: 120 INJECTION SUBCUTANEOUS at 06:40

## 2022-01-30 RX ADMIN — Medication 10 ML: at 20:12

## 2022-01-30 RX ADMIN — AZITHROMYCIN 500 MG: 500 INJECTION, POWDER, LYOPHILIZED, FOR SOLUTION INTRAVENOUS at 06:50

## 2022-01-30 ASSESSMENT — HEART SCORE: ECG: 0

## 2022-01-30 ASSESSMENT — PAIN DESCRIPTION - PAIN TYPE
TYPE: ACUTE PAIN
TYPE: ACUTE PAIN

## 2022-01-30 ASSESSMENT — PAIN DESCRIPTION - LOCATION
LOCATION: BACK;CHEST;RIB CAGE
LOCATION: BACK;CHEST;RIB CAGE

## 2022-01-30 ASSESSMENT — PAIN DESCRIPTION - ORIENTATION
ORIENTATION: LEFT
ORIENTATION: LEFT

## 2022-01-30 ASSESSMENT — PAIN SCALES - GENERAL
PAINLEVEL_OUTOF10: 8
PAINLEVEL_OUTOF10: 7

## 2022-01-30 ASSESSMENT — PAIN DESCRIPTION - DESCRIPTORS
DESCRIPTORS: SHARP
DESCRIPTORS: RADIATING;SHARP

## 2022-01-30 NOTE — PROGRESS NOTES
Arrived from [facility] via stretcher. Alert & oriented. Resp easy/even on room air. Denies pain/discomfort. Oriented to room and call light system.  Dr sher for orders

## 2022-01-30 NOTE — PLAN OF CARE
Problem: Gas Exchange - Impaired:  Goal: Levels of oxygenation will improve  Description: Levels of oxygenation will improve  Outcome: Ongoing   Resp easy/even this shift. Slight dyspnea on excertion. O2 sat 94% on room air.  Continue to monitor

## 2022-01-30 NOTE — H&P
Hospital Medicine History & Physical      PCP: Kristine Rose DO    Date of Admission: 1/30/2022    Date of Service: 1/30/2022    Pt seen/examined on 1/30/2022    Admitted to Inpatient with expected LOS greater than two midnights due to medical therapy. Chief Complaint:     Chief Complaint   Patient presents with    Shortness of Breath     Pt c/o Left sided back and shoulder pain this past week that has gotten worse and is now radiating around to under left breast area. Pain increases with deep breath. Pt recently had bunion surgery and has been laying down. c/o Right calf pain also. Sob of breath got worse tonight.  Chest Pain    Back Pain       History Of Present Illness:      54 y.o. female who presented to [facility]. with pleuritic left upper chest pain that began 2 nights ago and was mild until this morning when it was much worse. A/w with some shortness of breath. She has a mild dry cough that has been ongoing, but attributes this to the dry weather. No fevers, chills, or other systemic symptoms. No palpitations. No personal history of blood clots. She had a bunion removal 1/12 and has been sedentary due to non-weight bearing status since that time. Past Medical History:      Reviewed and non-contributory except as noted below      Diagnosis Date    CTS (carpal tunnel syndrome)     Low back pain        Past Surgical History:      Reviewed and non-contributory except as noted below      Procedure Laterality Date    BUNIONECTOMY  1/30/12    left    WISDOM TOOTH EXTRACTION         Medications Prior to Admission:      Reviewed and non-contributory except as noted below  Prior to Admission medications    Medication Sig Start Date End Date Taking?  Authorizing Provider   psyllium (KONSYL) 28.3 % PACK Take 1 packet by mouth daily   Yes Historical Provider, MD   Glucosamine-Chondroit-Vit C-Mn (GLUCOSAMINE 1500 COMPLEX) CAPS Take 1 tablet by mouth   Yes Historical Provider, MD   MAGNESIUM PO Take  by mouth daily. Yes Historical Provider, MD   Multiple Vitamin (MULTIVITAMIN PO) Take  by mouth daily. Yes Historical Provider, MD   LYSINE PO Take  by mouth daily. Yes Historical Provider, MD       Allergies:     Reviewed and non-contributory except as noted below   Patient has no known allergies. Social History:      Reviewed and non-contributory except as noted below    TOBACCO:   reports that she quit smoking about 17 years ago. She has a 7.50 pack-year smoking history. She has never used smokeless tobacco.  ETOH:   reports current alcohol use. Family History:      Reviewed and non-contributory except as noted below        Problem Relation Age of Onset    High Blood Pressure Mother     High Cholesterol Mother     Cancer Father 68        Lung cancer       REVIEW OF SYSTEMS:   Pertinent positives and negatives as noted in the HPI. All other systems reviewed and negative.     PHYSICAL EXAM PERFORMED:    /78   Pulse 86   Temp 98 °F (36.7 °C) (Oral)   Resp 16   Wt 195 lb (88.5 kg)   LMP 01/03/2022 (Exact Date)   SpO2 94%   BMI 32.45 kg/m²     General appearance:  No acute distress, appears stated age  Eyes: Pupils equal, round, reactive to light, conjunctiva/corneas clear  Ears/Nose/Mouth/Throat: No external lesions or scars, hearing intact to voice  Neck: Trachea midline, no masses noted, no thyromegaly  Respiratory:  Non-labored breathing, clear to auscultation bilaterally  Cardiovascular: Regular rate and rhythm, no murmurs, gallops, or rubs  Abdomen: soft, non-tender, non-distended  Musculoskeletal: Warm, well perfused, no cyanosis or edema  Skin: normal color, no wounds noted  Psychiatric: A&Ox4, good insight and judgment    Labs:     Recent Labs     01/30/22  0439   WBC 11.9*   HGB 12.6   HCT 37.7        Recent Labs     01/30/22  0439      K 3.9      CO2 24   BUN 15   CREATININE 0.7   CALCIUM 9.4     Recent Labs

## 2022-01-30 NOTE — ED PROVIDER NOTES
problem. I have reviewed the following from the nursing documentation:      Prior to Admission medications    Medication Sig Start Date End Date Taking? Authorizing Provider   Glucosamine-Chondroit-Vit C-Mn (GLUCOSAMINE 1500 COMPLEX) CAPS Take 1 tablet by mouth    Historical Provider, MD   MAGNESIUM PO Take  by mouth daily. Historical Provider, MD   Multiple Vitamin (MULTIVITAMIN PO) Take  by mouth daily. Historical Provider, MD   LYSINE PO Take  by mouth daily.       Historical Provider, MD       Allergies as of 2022    (No Known Allergies)       Past Medical History:   Diagnosis Date    CTS (carpal tunnel syndrome)     Low back pain         Surgical History:   Past Surgical History:   Procedure Laterality Date    BUNIONECTOMY  12    left    WISDOM TOOTH EXTRACTION          Family History:    Family History   Problem Relation Age of Onset    High Blood Pressure Mother     High Cholesterol Mother     Cancer Father 68        Lung cancer       Social History     Socioeconomic History    Marital status:      Spouse name: Not on file    Number of children: Not on file    Years of education: Not on file    Highest education level: Not on file   Occupational History    Not on file   Tobacco Use    Smoking status: Former Smoker     Packs/day: 0.50     Years: 15.00     Pack years: 7.50     Quit date: 2005     Years since quittin.0    Smokeless tobacco: Never Used   Vaping Use    Vaping Use: Never used   Substance and Sexual Activity    Alcohol use: Yes     Comment: 2 X per month    Drug use: No    Sexual activity: Yes     Partners: Male   Other Topics Concern    Not on file   Social History Narrative    Not on file     Social Determinants of Health     Financial Resource Strain:     Difficulty of Paying Living Expenses: Not on file   Food Insecurity:     Worried About Running Out of Food in the Last Year: Not on file    [place] of Food in the Last Year: Not on file   Transportation Needs:     Lack of Transportation (Medical): Not on file    Lack of Transportation (Non-Medical): Not on file   Physical Activity:     Days of Exercise per Week: Not on file    Minutes of Exercise per Session: Not on file   Stress:     Feeling of Stress : Not on file   Social Connections:     Frequency of Communication with Friends and Family: Not on file    Frequency of Social Gatherings with Friends and Family: Not on file    Attends Mu-ism Services: Not on file    Active Member of [address] [place] [address] [employer] or Organizations: Not on file    Attends Club or Organization Meetings: Not on file    Marital Status: Not on file   Intimate Partner Violence:     Fear of Current or Ex-Partner: Not on file    Emotionally Abused: Not on file    Physically Abused: Not on file    Sexually Abused: Not on file   Housing Stability:     Unable to Pay for Housing in the Last Year: Not on file    Number of [place] in the Last Year: Not on file    Unstable Housing in the Last Year: Not on file       Nursing notes reviewed. ED Triage Vitals [01/30/22 0427]   Enc Vitals Group      /65      Pulse 68      Resp 18      Temp 97.6 °F (36.4 °C)      Temp Source Oral      SpO2 91 %      Weight 195 lb (88.5 kg)      Height       Head Circumference       Peak Flow       Pain Score       Pain Loc       Pain Edu? Excl. in [address]? GENERAL:   Body mass index is 32.45 kg/m². Awake, alert. Well developed, well nourished with no apparent distress. Nontoxic-appearing, non-ill-appearing. HENT:   Normocephalic, Atraumatic, no lacerations. No ENT exam due to PPE. EYES:   Conjunctiva normal,   Pupils equal round and reactive to light,   Extraocular movements normal.  NECK:  Trachea is midline. No stridor.   CHEST:  Regular rate and regular rhythm, no murmurs/rubs/gallops,  normal S1/S2, chest wall tenderness in the left upper chest wall is clearly reproducible with palpation however on the left lower chest wall and underneath the breast the pain is not reproducible with palpation. LUNGS:  No respiratory distress. No abdominal retractions, no sternal retractions  Clear to auscultation bilaterally, no wheezing, no rhochi, no rales  Speaking comfortably in full sentences  ABDOMEN:  Soft, non-tender, non-distended. No guarding. No rebound. EXTREMITIES:  Moves extremities x4 with purpose. Normal range of motion, no edema,  No tenderness, no deformity,  distal pulses present and equal bilaterally. Right lower foot is in a compression dressing. She has good brisk cap refills in the toes of her right foot. BACK:  No midline tenderness in the cervical, thoracic, and lumbar spine. No deformities, no step-off. SKIN:  Warm, dry and intact. NEUROLOGIC:  Normal mental status. Moving all extremities to command. Alert and oriented x4  without focal motor deficit or gross sensory deficit. Normal speech. PSYCHIATRIC:  Not anxious,  normal mood and affect,  Appropriate eye contact,  thoughts are linear and organized,  without delusions/hallucinations,  Not responding to internal stimuli,  responds appropriately to questions    LABS and DIAGNOSTIC RESULTS  EKG  The Ekg interpreted by me shows  normal sinus rhythm with a rate of 72  Axis is   Normal  QTc is  within an acceptable range  Intervals and Durations are unremarkable. ST Segments: normal  Delta waves, Brugada Syndrome, and Short DE are not present. Prior EKG to compare with was not available. Cardiac Monitor Strip Interpretation  Interpreted by me  Monitor strip interpreted for greater than 10 seconds  Rhythm: normal sinus   Rate: normal  Ectopy: none  ST Segments: normal    RADIOLOGY  X-RAYS:  I have reviewed radiologic plain film image(s). ALL OTHER NON-PLAIN FILM IMAGES SUCH AS CT, ULTRASOUND AND MRI HAVE BEEN READ BY THE RADIOLOGIST. CT CHEST PULMONARY EMBOLISM W CONTRAST   Final Result       Very extensive bilateral pulmonary emboli.        Right upper lobe infiltration which may represent pneumonia. Inferior lingular infiltration which may represent pneumonia or    pulmonary infarct. XR CHEST (2 VW)   Final Result     Negative chest x-rays.                Chest X-Ray  Interpreted by: Emergency Department Physician  View: PA/Lateral chest xray  Findings: See radiology report    LABS  Results for orders placed or performed during the hospital encounter of 01/30/22   SARS-CoV-2 NAAT (Rapid)    Specimen: Nasopharyngeal Swab   Result Value Ref Range    SARS-CoV-2, NAAT Not Detected Not Detected   CBC Auto Differential   Result Value Ref Range    WBC 11.9 (H) 4.0 - 11.0 K/uL    RBC 4.25 4.00 - 5.20 M/uL    Hemoglobin 12.6 12.0 - 16.0 g/dL    Hematocrit 37.7 36.0 - 48.0 %    MCV 88.9 80.0 - 100.0 fL    MCH 29.7 26.0 - 34.0 pg    MCHC 33.5 31.0 - 36.0 g/dL    RDW 14.3 12.4 - 15.4 %    Platelets 720 859 - 898 K/uL    MPV 8.5 5.0 - 10.5 fL    Neutrophils % 81.2 %    Lymphocytes % 9.9 %    Monocytes % 6.4 %    Eosinophils % 1.7 %    Basophils % 0.8 %    Neutrophils Absolute 9.7 (H) 1.7 - 7.7 K/uL    Lymphocytes Absolute 1.2 1.0 - 5.1 K/uL    Monocytes Absolute 0.8 0.0 - 1.3 K/uL    Eosinophils Absolute 0.2 0.0 - 0.6 K/uL    Basophils Absolute 0.1 0.0 - 0.2 K/uL   Comprehensive Metabolic Panel w/ Reflex to MG   Result Value Ref Range    Sodium 138 136 - 145 mmol/L    Potassium reflex Magnesium 3.9 3.5 - 5.1 mmol/L    Chloride 102 99 - 110 mmol/L    CO2 24 21 - 32 mmol/L    Anion Gap 12 3 - 16    Glucose 167 (H) 70 - 99 mg/dL    BUN 15 7 - 20 mg/dL    CREATININE 0.7 0.6 - 1.1 mg/dL    GFR Non-African American >60 >60    GFR African American >60 >60    Calcium 9.4 8.3 - 10.6 mg/dL    Total Protein 7.7 6.4 - 8.2 g/dL    Albumin 4.0 3.4 - 5.0 g/dL    Albumin/Globulin Ratio 1.1 1.1 - 2.2    Total Bilirubin 0.3 0.0 - 1.0 mg/dL    Alkaline Phosphatase 76 40 - 129 U/L    ALT 9 (L) 10 - 40 U/L    AST 14 (L) 15 - 37 U/L   Troponin   Result Value Ref Range Troponin <0.01 <0.01 ng/mL   D-Dimer, Quantitative   Result Value Ref Range    D-Dimer, Quant 1112 (H) 0 - 229 ng/mL DDU   HCG Qualitative, Serum   Result Value Ref Range    hCG Qual Negative Detects HCG level >10 MIU/mL       SCREENINGS  NIH Score     [facility]     [facility]    Heart Score  Heart Score for chest pain patients  History: Slightly Suspicious  ECG: Normal  Patient Age: > 39 and < 65 years  *Risk factors for Atherosclerotic disease: Obesity  Risk Factors: 1 or 2 risk factors  Troponin: < 1X normal limit  Heart Score Total: 2    PROCEDURES    MEDICAL DECISION MAKING    Procedures/interventions/images ordered for this visit  Orders Placed This Encounter   Procedures    SARS-CoV-2 NAAT (Rapid)    XR CHEST (2 VW)    CT CHEST PULMONARY EMBOLISM W CONTRAST    CBC Auto Differential    Comprehensive Metabolic Panel w/ Reflex to MG    Troponin    D-Dimer, Quantitative    HCG Qualitative, Serum    Initiate Oxygen Therapy Protocol    EKG 12 Lead    Saline lock IV       Medications ordered for this visit  Orders Placed This Encounter   Medications    DISCONTD: ketorolac (TORADOL) injection 30 mg    iopamidol (ISOVUE-370) 76 % injection 80 mL    enoxaparin (LOVENOX) injection 120 mg    AND Linked Order Group     cefTRIAXone (ROCEPHIN) 1000 mg IVPB in 50 mL D5W minibag      Order Specific Question:   Antimicrobial Indications      Answer:   Pneumonia (CAP)     azithromycin (ZITHROMAX) 500 mg in dextrose 5 % 250 mL IVPB      Order Specific Question:   Antimicrobial Indications      Answer:   Pneumonia (CAP)    0.9 % sodium chloride infusion       ED course notes for this visit  ED Course as of 01/30/22 0631   Juan Lockhart Jan 30, 2022   0630 Discussed the results of the CT with the patient, she understands she will be started on antibiotics as well as a dose of Lovenox. I feel that admission is warranted given that at baseline patient with 91%.   We are are unable to check her pulse oximetry with ambulation because of her current instructions to be nonweightbearing on the right foot. [SG]      ED Course User Index  [SG] Nick Liang MD       I evaluated the patient in room 08/08     SEP-1 CORE MEASURE DATA      Sepsis Criteria   Severe Sepsis Criteria   Septic Shock Criteria     Must be confirmed or suspected to move forward with diagnosis of sepsis. Must meet 2:    [] Temperature > 100.9 F (38.3 C)        or < 96.8 F (36 C)  [] HR > 90  [] RR > 20  [] WBC > 12 or < 4 or 10% bands    AND:    [] Infection Confirmed or        Suspected. OR:    [x] Exclude from SEP-1 because:    [] No infection present or suspected  [x] Does not have 2+ SIRS criteria but may have an incidental infection that requires treatment  [x] May have sepsis, but does not meet criteria for severe sepsis or septic shock  [x] Alternative explanation for abnormal labs and/or vitals (see MDM)  [] Viral etiology found or highly suspected (including COVID-19) without concomitant bacterial infection   Must meet 1:    [] Lactate > 2       or   [] Signs of Organ Dysfunction:    - SBP < 90 or MAP < 65  - Altered mental status  - Creatinine > 2 or increased from      baseline  - Urine Output < 0.5 ml/kg/hr  - Bilirubin > 2  - INR > 1.5 (not anticoagulated)  - Platelets < 526,379  - Acute Respiratory Failure as     evidenced by new need for NIPPV     or mechanical ventilation        [] No criteria met for Severe Sepsis. Must meet 1:    [] Lactate > 4        or   [] SBP < 90 or MAP < 65 for at        least two readings in the first        hour after fluid bolus        administration      [] Vasopressors initiated (if hypotension persists after fluid resuscitation)                [] No criteria met for Septic Shock. I spoke with Dr. Calixto Wylie. We thoroughly discussed the history, physical exam, laboratory and imaging studies, as well as, emergency department course.  Based upon that discussion, we've decided to admit Mauri Riggs for further observation and evaluation of Veda Levine's PE and PNA. As I have deemed necessary from their history, physical, and studies, I have considered and evaluated Mauri Riggs for the following diagnoses:  Differential Diagnosis: Acute Coronary Syndrome, Congestive Heart Failure, Thoracic Dissection, Pericarditis, Pericardial Effusion, Pulmonary Embolism, Pneumonia, Pneumothorax, Ischemic Bowel, Bowel Obstruction, PUD, GERD, Acute Cholecystitis, Pancreatitis, Hepatitis, Colitis,  Acute Coronary Syndrome, Congestive Heart Failure, Myocardial Infarction, Pulmonary Embolus, Pneumonia, Pneumothorax, other        FINAL IMPRESSION  1. Acute respiratory failure with hypoxia (HCC)    2. Chest pain, unspecified type    3. Shortness of breath    4. Pneumonia of both lungs due to infectious organism, unspecified part of lung    5. Multiple subsegmental pulmonary emboli without acute cor pulmonale (HCC)        Vitals:  Blood pressure 132/60, pulse 84, temperature 97.6 °F (36.4 °C), temperature source Oral, resp. rate 16, weight 195 lb (88.5 kg), last menstrual period 01/03/2022, SpO2 96 %, not currently breastfeeding. Disposition  Patient understands that they are going to be admitted to the hospital.  There was shared decision making between myself as well as the patient and/or their surrogate and we are in agreement with admission. There is an opportunity for questions and all questions answered to the best of my ability and to the satisfaction of the patient and/or patient's family. Pt is in stable condition upon Admit to CCU/ICU. Pt was seen during the [place] 19 pandemic. Appropriate PPE worn by this writer during patient encounters. Pt seen during a time with constrained hospital bed capacity and other potential inpatient and outpatient resources were constrained due to the viral pandemic.      Please note, critical care time was at least 45 minutes, obtaining history, conducting a physical exam, performing and monitoring interventions, ordering, collecting and interpreting tests, and establishing medical decision-making and discussion with the patient and/or family, specifically for management of the presenting complaint and symptoms initially, direct bedside care, reevaluation, review of records, and consultation. There was a high probability of clinically significant life-threatening deterioration in the patient's condition, which required my urgent intervention. This time does not include separately billable procedures. The note was completed using Dragon voice recognition transcription. Every effort was made to ensure accuracy; however, inadvertent transcription errors may be present despite my best efforts to edit errors.     Luis Miguel Whatley MD  [address] MD  01/30/22 MD Joanna  01/30/22 0052

## 2022-01-31 VITALS
SYSTOLIC BLOOD PRESSURE: 135 MMHG | OXYGEN SATURATION: 94 % | DIASTOLIC BLOOD PRESSURE: 86 MMHG | WEIGHT: 195 LBS | BODY MASS INDEX: 32.45 KG/M2 | TEMPERATURE: 98.1 F | RESPIRATION RATE: 16 BRPM | HEART RATE: 94 BPM

## 2022-01-31 LAB
ANION GAP SERPL CALCULATED.3IONS-SCNC: 8 MMOL/L (ref 3–16)
BUN BLDV-MCNC: 9 MG/DL (ref 7–20)
CALCIUM SERPL-MCNC: 8.7 MG/DL (ref 8.3–10.6)
CHLORIDE BLD-SCNC: 105 MMOL/L (ref 99–110)
CO2: 26 MMOL/L (ref 21–32)
CREAT SERPL-MCNC: 0.5 MG/DL (ref 0.6–1.1)
EKG ATRIAL RATE: 72 BPM
EKG DIAGNOSIS: NORMAL
EKG P AXIS: 54 DEGREES
EKG P-R INTERVAL: 176 MS
EKG Q-T INTERVAL: 430 MS
EKG QRS DURATION: 112 MS
EKG QTC CALCULATION (BAZETT): 470 MS
EKG R AXIS: 50 DEGREES
EKG T AXIS: 60 DEGREES
EKG VENTRICULAR RATE: 72 BPM
GFR AFRICAN AMERICAN: >60
GFR NON-AFRICAN AMERICAN: >60
GLUCOSE BLD-MCNC: 99 MG/DL (ref 70–99)
HCT VFR BLD CALC: 35.6 % (ref 36–48)
HEMOGLOBIN: 12 G/DL (ref 12–16)
MCH RBC QN AUTO: 30.4 PG (ref 26–34)
MCHC RBC AUTO-ENTMCNC: 33.7 G/DL (ref 31–36)
MCV RBC AUTO: 90.4 FL (ref 80–100)
PDW BLD-RTO: 14.8 % (ref 12.4–15.4)
PLATELET # BLD: 248 K/UL (ref 135–450)
PMV BLD AUTO: 9 FL (ref 5–10.5)
POTASSIUM REFLEX MAGNESIUM: 3.9 MMOL/L (ref 3.5–5.1)
RBC # BLD: 3.94 M/UL (ref 4–5.2)
SODIUM BLD-SCNC: 139 MMOL/L (ref 136–145)
TROPONIN: <0.01 NG/ML
WBC # BLD: 8 K/UL (ref 4–11)

## 2022-01-31 PROCEDURE — 93010 ELECTROCARDIOGRAM REPORT: CPT | Performed by: INTERNAL MEDICINE

## 2022-01-31 PROCEDURE — 2580000003 HC RX 258: Performed by: INTERNAL MEDICINE

## 2022-01-31 PROCEDURE — 99223 1ST HOSP IP/OBS HIGH 75: CPT | Performed by: INTERNAL MEDICINE

## 2022-01-31 PROCEDURE — 80048 BASIC METABOLIC PNL TOTAL CA: CPT

## 2022-01-31 PROCEDURE — 6370000000 HC RX 637 (ALT 250 FOR IP): Performed by: INTERNAL MEDICINE

## 2022-01-31 PROCEDURE — 84484 ASSAY OF TROPONIN QUANT: CPT

## 2022-01-31 PROCEDURE — 85027 COMPLETE CBC AUTOMATED: CPT

## 2022-01-31 PROCEDURE — 36415 COLL VENOUS BLD VENIPUNCTURE: CPT

## 2022-01-31 PROCEDURE — 6360000002 HC RX W HCPCS: Performed by: INTERNAL MEDICINE

## 2022-01-31 RX ADMIN — ACETAMINOPHEN 650 MG: 325 TABLET ORAL at 06:40

## 2022-01-31 RX ADMIN — DOCUSATE SODIUM 50 MG AND SENNOSIDES 8.6 MG 1 TABLET: 8.6; 5 TABLET, FILM COATED ORAL at 09:09

## 2022-01-31 RX ADMIN — ENOXAPARIN SODIUM 90 MG: 100 INJECTION SUBCUTANEOUS at 09:11

## 2022-01-31 RX ADMIN — Medication 10 ML: at 09:09

## 2022-01-31 RX ADMIN — ACETAMINOPHEN 650 MG: 325 TABLET ORAL at 16:29

## 2022-01-31 ASSESSMENT — PAIN SCALES - GENERAL
PAINLEVEL_OUTOF10: 8
PAINLEVEL_OUTOF10: 0
PAINLEVEL_OUTOF10: 8

## 2022-01-31 NOTE — PROGRESS NOTES
Patient alert and oriented x4. VSS. Patient is ready for discharge. All IV's taken out. Catheter intact. Dressing applied. All discharge questions answered. Denies pain and denies n/v at this time. Bed in lowest position. Will continue to monitor.

## 2022-01-31 NOTE — PROGRESS NOTES
Pt arrived to unit, report received from [facility], pt a/o x4, no c/o pain or discomfort, no skin issues, previous bunion removal noted, pt made comfortable, bed alarm on, call light within reach.

## 2022-01-31 NOTE — PLAN OF CARE
Problem: Pain:  Goal: Pain level will decrease  Description: Pain level will decrease. Outcome: Ongoing     Problem: Pain:  Goal: Control of acute pain  Description: Control of acute pain.   Outcome: Ongoing

## 2022-01-31 NOTE — PROGRESS NOTES
Hospitalist Progress Note      PCP: Phuong Cook DO    Date of Admission: 1/30/2022    Chief Complaint: [facility] Course: ***     Subjective: ***       Medications:  Reviewed    Infusion Medications    sodium chloride       Scheduled Medications    sodium chloride flush  10 mL IntraVENous 2 times per day    sennosides-docusate sodium  1 tablet Oral BID    enoxaparin  1 mg/kg SubCUTAneous BID    bismuth subsalicylate  800 mg Oral 4x Daily AC & HS     PRN Meds: sodium chloride flush, sodium chloride, ondansetron **OR** ondansetron, magnesium hydroxide, acetaminophen **OR** acetaminophen      Intake/Output Summary (Last 24 hours) at 1/31/2022 0843  Last data filed at 1/30/2022 1413  Gross per 24 hour   Intake 360 ml   Output    Net 360 ml       Exam:    BP (!) 148/79   Pulse 91   Temp 98.4 °F (36.9 °C) (Oral)   Resp 16   Wt 195 lb (88.5 kg)   LMP 01/03/2022 (Exact Date)   SpO2 96%   BMI 32.45 kg/m²     General appearance: No apparent distress, appears stated age and cooperative. HEENT: Pupils equal, round, and reactive to light. Conjunctivae/corneas clear. Neck: Supple, with full range of motion. No jugular venous distention. Trachea midline. Respiratory:  Normal respiratory effort. Clear to auscultation, bilaterally without Rales/Wheezes/Rhonchi. Cardiovascular: Regular rate and rhythm with normal S1/S2 without murmurs, rubs or gallops. Abdomen: Soft, non-tender, non-distended with normal bowel sounds. Musculoskelatal: No clubbing, cyanosis or edema bilaterally. Skin: Skin color, texture, turgor normal.  No rashes or lesions.   Neurologic:  Cranial nerves: II-XII intact, grossly non-focal.  Psychiatric: Alert and oriented, thought content appropriate, normal insight    Labs:   Recent Labs     01/30/22  0439 01/31/22  0548   WBC 11.9* 8.0   HGB 12.6 12.0   HCT 37.7 35.6*    248     Recent Labs     01/30/22  0439 01/31/22  0548    139   K 3.9 3.9    105 CO2 24 26   BUN 15 9   CREATININE 0.7 0.5*   CALCIUM 9.4 8.7     Recent Labs     01/30/22  0439   AST 14*   ALT 9*   BILITOT 0.3   ALKPHOS 76     No results for input(s): INR in the last 72 hours. Recent Labs     01/30/22  1924 01/31/22  0032 01/31/22  0548   TROPONINI <0.01 <0.01 <0.01       Studies:  CT CHEST PULMONARY EMBOLISM W CONTRAST   Final Result       Very extensive bilateral pulmonary emboli. Right upper lobe infiltration which may represent pneumonia. Inferior lingular infiltration which may represent pneumonia or    pulmonary infarct. XR CHEST (2 VW)   Final Result     Negative chest x-rays. Assessment/Plan:    Active Hospital Problems    Diagnosis Date Noted    Bilateral pulmonary embolism ([facility] [address] [I26.99] 01/30/2022           DVT Prophylaxis: ***  Diet: ADULT DIET;  Regular  Code Status: Full Code    PT/OT Eval Status:     Dispo - Inpatient    Tiffanie Gucci ELY

## 2022-01-31 NOTE — PROGRESS NOTES
Pt is 91% on RA, resp easy, lungs CTA. Denies any SOB with activity. C&DB encouraged and 2 liters NC donned. Vital signs stable, afebrile. Will continue to monitor O2sats.

## 2022-01-31 NOTE — CARE COORDINATION
Case Management Assessment           Initial Evaluation                Date / Time of Evaluation: 1/31/2022 2:56 PM                 Assessment Completed by: Romeo Sierra RN     Patient is from home with her spouse and will return to home at d/c. Her mother will transport her to home if she d/c's today. She has a walker for home and denies the need for home care. She would like her medications filled here prior to d/c. We are able to give coupons for Xarelto or Eliquis for at least 30 days. Patient Name: Benjamin Campbell     YOB: 1970  Diagnosis: Shortness of breath [R06.02]  Bilateral pulmonary embolism ([address] [I26.99]  Acute respiratory failure with hypoxia ([address] [J96.01]  Pneumonia of both lungs due to infectious organism, unspecified part of lung [J18.9]  Chest pain, unspecified type [R07.9]  Multiple subsegmental pulmonary emboli without acute cor pulmonale ([address] [I26.94]     Date / Time: 1/30/2022  4:23 AM    Patient Admission Status: Inpatient    If patient is discharged prior to next notation, then this note serves as note for discharge by case management. Current PCP: DO [facility] Patient: No    Chart Reviewed: Yes  Patient/ Family Interviewed: Yes    Initial assessment completed at bedside with: patient    Hospitalization in the last 30 days: No    Emergency Contacts:  Extended Emergency Contact Information  Primary Emergency Contact: [place] [facility] [address] Phone: [phone]  Work Phone: [phone]  Mobile Phone: [phone]  Relation: Spouse  Secondary Emergency Contact: [address], [address] [facility] Phone: [phone]  Relation: Parent  Preferred language: English   needed?  No    Advance Directives:   Code Status: Full Code    Healthcare Power of : No  Agent: NA  Contact Number: NA      Financial  Payor: Ragini Keyes  / Plan: Samreen Woodward LUIS MANUEL / Product Type: *No Product type* /     Pre-cert required for SNF: Yes    Pharmacy    [address], [address] [facility] [address] [place] [phone]  [place] Katerin Simpson  Phone: [phone] Fax: [phone]      Potential assistance Purchasing Medications: Potential Assistance Purchasing Medications: No  Does Patient want to participate in local refill/ meds to beds program?:      Meds To Beds General Rules:  1. Can ONLY be done Monday- Friday between 8:30am-5pm  2. Prescription(s) must be in pharmacy by 3pm to be filled same day  3. Copy of patient's insurance/ prescription drug card and patient face sheet must be sent along with the prescription(s)  4. Cost of Rx cannot be added to hospital bill. If financial assistance is needed, please contact unit  or ;  or  CANNOT provide pharmacy voucher for patients co-pays  5.  Patients can then  the prescription on their way out of the hospital at discharge, or pharmacy can deliver to the bedside if staff is available. (payment due at time of pick-up or delivery - cash, check, or card accepted)     Able to afford home medications/ co-pay costs: Yes    ADLS  Support Systems: Spouse/Significant Other,Family Members    PT AM-PAC:   /24  OT AM-PAC:   /24    [place] 2 story home  Steps: 3 steps to enter and then another 10 to her room    Plans to RETURN to current housing: Yes  Barriers to RETURNING to current housing: none noted    Durable Medical Equipment  DME Provider:   Equipment: walker      DISCHARGE PLAN:  Disposition: Home- No Services Needed    Transportation PLAN for discharge: family     Factors facilitating achievement of predicted outcomes: Family support, Cooperative and Pleasant    Barriers to discharge: pending medication change for blood thinner    Additional Case Management Notes: NA    The Plan for Transition of Care is related to the following treatment goals of Shortness of breath [R06.02]  Bilateral pulmonary embolism (HCC) [I26.99]  Acute respiratory failure with hypoxia (HCC) [J96.01]  Pneumonia of both lungs due to infectious organism, unspecified part of lung [J18.9]  Chest pain, unspecified type [R07.9]  Multiple subsegmental pulmonary emboli without acute cor pulmonale ([address] [I26.94]    The Patient and/or patient representative Radha Rueda and her family were provided with a choice of provider and agrees with the discharge plan Not Indicated    Freedom of choice list was provided with basic dialogue that supports the patient's individualized plan of care/goals and shares the quality data associated with the providers.  Not Indicated    Care Transition patient: Myrna Oviedo RN  [facility] [employer]  Case Management Department  Ph: [phone]   Fax: [phone]

## 2022-01-31 NOTE — PROGRESS NOTES
Physical Therapy/Occupational Therapy  Refusal/discharge    Referral received, chart reviewed. Upon interview with pt, she declined acute PT and OT services stating she has no concerns about d/c home and has all of the DME she needs. Will sign off per pt request. RN aware. Yimi Pagan, PT  [place].  [address] [facility], OTR/L [id number]

## 2022-01-31 NOTE — PROGRESS NOTES
Pt will be transferred to room 5515, via bed with all her belongings and medications. Called report to [facility].

## 2022-01-31 NOTE — CONSULTS
Pulmonary Consult Note      Reason for Consult: extensive BL PE, lung infarct vs PNA  Requesting Physician: Dr. Cody Orellana   Subjective:   No acute events overnight. Patient seen and examined at been side. Patient's chart and notes were reviewed. Currently on room air sats in upper 90's Patient reports pain has improved she    Patient denies SOB, fever, chills, coughing. Patient is hemodynamically stable and afebrile. She denied any blood clots in the past and denied any family history of clotting disorder. NO estrogen use. CHIEF COMPLAINT / HPI:                Bonnie Lugo is a 54 y.o. female with PMH as below notable for carpal tunnel syndrome and low back pain who presented with chest pain ans SOB. She states that for the past couple of days she had pain in her left shoulder so she decided to come in. Patient had bunion surgery on January 12 and since then she states she has not been active and was resting her legs a lot and staying in bed. On admission Patient was hemodynamically stable and sats in mid 90's. CTPA showed very extensive pulmonary emboli with RUL infiltration that may represent PNA. She was started on therapeutic lovenox. Pulmonology service was consulted for further workup and management of CT findings of infiltrations     Past Medical History:      Diagnosis Date    CTS (carpal tunnel syndrome)     Low back pain       Past Surgical History:        Procedure Laterality Date    BUNIONECTOMY  1/30/12    left    WISDOM TOOTH EXTRACTION       Current Medications:     sodium chloride flush  10 mL IntraVENous 2 times per day    sennosides-docusate sodium  1 tablet Oral BID    enoxaparin  1 mg/kg SubCUTAneous BID    bismuth subsalicylate  310 mg Oral 4x Daily AC & HS     Allergies:  No Known Allergies  Social History:    TOBACCO:   reports that she quit smoking about 17 years ago. She has a 7.50 pack-year smoking history.  She has never used smokeless tobacco.  ETOH:   reports current alcohol use. Family History:       Problem Relation Age of Onset    High Blood Pressure Mother     High Cholesterol Mother     Cancer Father 68        Lung cancer       Review of Systems  A 10 point review of systems was conducted, significant findings as noted in HPI. Objective:   PHYSICAL EXAM:      VITALS:  BP (!) 148/79   Pulse 91   Temp 98.4 °F (36.9 °C) (Oral)   Resp 16   Wt 195 lb (88.5 kg)   LMP 2022 (Exact Date)   SpO2 96%   BMI 32.45 kg/m²   24HR INTAKE/OUTPUT:      Intake/Output Summary (Last 24 hours) at 2022 0911  Last data filed at 2022 0846  Gross per 24 hour   Intake 600 ml   Output --   Net 600 ml     CURRENT PULSE OXIMETRY:  SpO2: 96 %  24HR PULSE OXIMETRY RANGE:  SpO2  Av %  Min: 91 %  Max: 96 % on room air    Physical Exam  Constitutional:       Appearance: Normal appearance. HENT:      Head: Normocephalic and atraumatic. Mouth/Throat:      Mouth: Mucous membranes are moist.   Eyes:      Extraocular Movements: Extraocular movements intact. Conjunctiva/sclera: Conjunctivae normal.   Cardiovascular:      Rate and Rhythm: Normal rate and regular rhythm. Pulmonary:      Effort: Pulmonary effort is normal. No respiratory distress. Breath sounds: Normal breath sounds. No wheezing, rhonchi or rales. Abdominal:      General: Abdomen is flat. Bowel sounds are normal.      Palpations: Abdomen is soft. Musculoskeletal:         General: Normal range of motion. Skin:     General: Skin is warm and dry. Neurological:      General: No focal deficit present. Mental Status: She is alert.    Psychiatric:         Mood and Affect: Mood normal.         DATA:    Old records have been reviewed  CBC with Differential:    Lab Results   Component Value Date    WBC 8.0 2022    RBC 3.94 2022    HGB 12.0 2022    HCT 35.6 2022     2022    MCV 90.4 2022    MCH 30.4 2022    MCHC 33.7 2022    RDW 14.8 01/31/2022    LYMPHOPCT 9.9 01/30/2022    MONOPCT 6.4 01/30/2022    BASOPCT 0.8 01/30/2022    MONOSABS 0.8 01/30/2022    LYMPHSABS 1.2 01/30/2022    EOSABS 0.2 01/30/2022    BASOSABS 0.1 01/30/2022     BMP:    Lab Results   Component Value Date     01/31/2022    K 3.9 01/31/2022     01/31/2022    CO2 26 01/31/2022    BUN 9 01/31/2022    CREATININE 0.5 01/31/2022    CALCIUM 8.7 01/31/2022    GFRAA >60 01/31/2022    LABGLOM >60 01/31/2022    GLUCOSE 99 01/31/2022     Hepatic Function Panel:    Lab Results   Component Value Date    ALKPHOS 76 01/30/2022    ALT 9 01/30/2022    AST 14 01/30/2022    PROT 7.7 01/30/2022    BILITOT 0.3 01/30/2022     ABG:  No results found for: DEI0SGL, BEART, Q4SMHERW, PHART, THGBART, MST3GTH, PO2ART, FRB7AOB    Cultures:   Blood Culture:    Sputum Culture:        Radiology Review:  All pertinent images / reports were reviewed as a part of this visit. Imaging reveals the following:  CT CHEST PULMONARY EMBOLISM W CONTRAST   Final Result       Very extensive bilateral pulmonary emboli. Right upper lobe infiltration which may represent pneumonia. Inferior lingular infiltration which may represent pneumonia or    pulmonary infarct. XR CHEST (2 VW)   Final Result     Negative chest x-rays. Other diagnostic test:      Assessment/Plan   54 y.o. female with SOB and pleuritic chest pain       Bilateral Pulmonary Embolism  Likely 2/2 to being sedentary after surgery, patient has no blood clots in the past, no family history   CTPA with infiltration likely pulmonary infarct, low suspicion for PNA give no cough, fever, leukocytosis. Procal WNL  Consider further work-up for coagulation disorder as outpatient  - would transition to Eliquis 10 bid for 7 days then 5 bid for 6 mo as this is likely provoked  - does not need to be in the hospital from pulmonary standpoint    Thank you for the opportunity to participate in [facility].     Plan and assessment discussed with attending, Dr. Irish Berumen. Dann Wadsworth MD, PGY1  1/31/2022  9:11 AM    Pulm    Patient seen and examined. I agree with Dr. Perla Quiros history, physical, lab findings, assessment and plan. Patient had recent bunion surgery and was immobile  She developed pleuritic chest pain and came to ER and was found to have bilateral pulmonary embolus with small pulmonary infarcts. No evidence of pneumonia    She is doing well and has an oxygen saturation of 94% on room air. Blood pressure 135/86 with a pulse of 94. She offers no complaints today    She currently is on Lovenox and this can be transitioned to Eliquis 10 mg p.o. twice daily x10 days then 5 mg p.o. twice daily to finish a 6-month course. She does not smoke, use hormone replacement therapy, or OCP. She has no previous history or family history of thromboembolism. She does not have any history of any miscarriages or miscarriages in her family. She is planning on returning to work in 2 weeks and this is acceptable to me.     I have put my office contact information on her discharge paperwork in case she has any pulmonary issues after discharge but she does not necessarily need to follow-up with me    CT images were reviewed with the patient and all questions answered    Giana Bang MD

## 2022-01-31 NOTE — PROGRESS NOTES
4 Eyes Admission Assessment     I agree as the admission nurse that 2 RN's have performed a thorough Head to Toe Skin Assessment on the patient. ALL assessment sites listed below have been assessed on admission. Areas assessed by both nurses:   [x]   Head, Face, and Ears   [x]   Shoulders, Back, and Chest  [x]   Arms, Elbows, and Hands   [x]   Coccyx, Sacrum, and Ischium  [x]   Legs, Feet, and Heels        Does the Patient have Skin Breakdown?   No         Eduar Prevention initiated:  yes   Wound Care Orders initiated:  no      [facility] nurse consulted for Pressure Injury (Stage 3,4, Unstageable, DTI, NWPT, and Complex wounds) or Eduar score 18 or lower:  yes}      Nurse 1 eSignature: Electronically signed by Celestina Quinn RN on 1/31/22 at 7:04 AM EST    **SHARE this note so that the co-signing nurse is able to place an eSignature**    Nurse 2 eSignature: Electronically signed by Claus Guerra RN on 1/31/22 at 5:04 AM EST

## 2022-01-31 NOTE — DISCHARGE SUMMARY
Hospital Medicine Discharge Summary    Patient: Dannielle De La Rosa     Gender: female  : 1970   Age: 54 y.o. MRN: [id number]    Admitting Physician: Mack Phoenix MD  Discharge Physician: Teri Miller DO    Code Status: Full Code     Admit Date: 2022   Discharge Date: 2022      Disposition:  Home     Discharge Diagnoses: Active Hospital Problems    Diagnosis Date Noted    Bilateral pulmonary embolism ([address] [I26.99] 2022         Outpatient to do list:     1) Follow-up appointments:    Primary care provider      Condition at Discharge: [facility] Course:   Patient was admitted for evaluation of postoperative PE with no evidence of right heart strain. She was monitored overnight. Pulmonology consulted. She had no oxygen requirement and is feeling well. Transitioned to Eliquis which she will continue on discharge. Additional findings or notes to primary provider:  None at this time    Discharge Medications:   Current Discharge Medication List      START taking these medications    Details   apixaban (ELIQUIS) 5 MG TABS tablet Take 2 tablets by mouth twice daily for 7 days then take 1 tablet by mouth twice daily thereafter. Qty: 74 tablet, Refills: 0           Current Discharge Medication List        Current Discharge Medication List      CONTINUE these medications which have NOT CHANGED    Details   psyllium (KONSYL) 28.3 % PACK Take 1 packet by mouth daily      Glucosamine-Chondroit-Vit C-Mn (GLUCOSAMINE 1500 COMPLEX) CAPS Take 1 tablet by mouth      MAGNESIUM PO Take  by mouth daily. Multiple Vitamin (MULTIVITAMIN PO) Take  by mouth daily. LYSINE PO Take  by mouth daily. Current Discharge Medication List          Discharge ROS:  A complete review of systems was asked and negative.     Discharge Exam:    /86   Pulse 94   Temp 98.1 °F (36.7 °C) (Oral)   Resp 16   Wt 195 lb (88.5 kg)   LMP 2022 (Exact Date)   SpO2 94%   BMI Unremarkable. No acute abnormalities. Electronically signed by Israel Rascon MD on 01-30-22 at 9121      Negative chest x-rays. CT CHEST PULMONARY EMBOLISM W CONTRAST    Result Date: 1/30/2022  CR Patient: Meghan Manning  Time Out: 06:22 Exam(s): CTA CHEST With Contrast IV Amt: 80ml isovue 370  EXAM:   CT Angiography Chest With Intravenous Contrast  CLINICAL HISTORY:   recent surgery, pleuritic chest pain with SOB, r/o PE.  TECHNIQUE:   Axial computed tomographic angiography images of the chest with intravenous contrast.  CTDI is 29.13 mGy and DLP is 510.03 mGy-cm. All CT scans at this facility use dose modulation, iterative reconstruction, and/or weight based dosing when appropriate to reduce radiation dose to as low as reasonably achievable. MIP reconstructed images were created and reviewed. COMPARISON:   No relevant prior studies available. FINDINGS:   Pulmonary arteries: There are extensive pulmonary emboli bilaterally. Clots are seen in the distal left main pulmonary artery extending extensively into segmental and subsegmental pulmonary arterial branches of the left upper lobe and left lower lobe. Thrombi are also seen extensively within the right lower lobe segmental and subsegmental pulmonary arterial branches. Aorta:  No acute findings. No thoracic aortic aneurysm. Lungs:  Infiltration in the right upper lobe consistent with pneumonia. Infiltration in the lingula of the left upper lobe may represent pneumonia or pulmonary infarction. Small amount of atelectasis in the left lower lobe. No mass. Pleural space:  Trace left pleural effusion. No pneumothorax. Heart: There is no clear CT evidence of right heart strain. No significant pericardial effusion. Bones/joints:  No acute fracture. No dislocation. Soft tissues:  Unremarkable. Lymph nodes:  Unremarkable. No enlarged lymph nodes.     Communications:  01/30/22 06:23 Call Doctor Regarding Pulmonary Embolism, called   Dylan Morton on 01/30 06:23 (-05:00)  Electronically signed by Akbar Rodriguez MD on 01-30-22 at 4192     Very extensive bilateral pulmonary emboli. Right upper lobe infiltration which may represent pneumonia. Inferior lingular infiltration which may represent pneumonia or pulmonary infarct. The patient was seen and examined on day of discharge and this discharge summary is in conjunction with any daily progress note from day of discharge. Time Spent on discharge is more than 30 minutes in the examination, evaluation, counseling and review of medications and discharge plan. BrendenHochris Diana DO   1/31/2022      Thank you [address], DO for the opportunity to be involved in this patient's care. If you have any questions or concerns please feel free to contact me at [employer]serve.

## 2022-01-31 NOTE — TELEPHONE ENCOUNTER
Spoke to pt the correct copay is 40 not 60 I gave her the billing department number to call for refund

## 2022-02-07 DIAGNOSIS — I26.99 BILATERAL PULMONARY EMBOLISM (HCC): Primary | ICD-10-CM

## 2022-02-25 ENCOUNTER — OFFICE VISIT (OUTPATIENT)
Dept: PRIMARY CARE CLINIC | Age: 52
End: 2022-02-25
Payer: COMMERCIAL

## 2022-02-25 VITALS
SYSTOLIC BLOOD PRESSURE: 115 MMHG | HEART RATE: 67 BPM | OXYGEN SATURATION: 97 % | BODY MASS INDEX: 32.49 KG/M2 | HEIGHT: 65 IN | DIASTOLIC BLOOD PRESSURE: 58 MMHG | TEMPERATURE: 97.3 F | WEIGHT: 195 LBS

## 2022-02-25 DIAGNOSIS — R63.0 LOSS OF APPETITE: ICD-10-CM

## 2022-02-25 DIAGNOSIS — I26.99 BILATERAL PULMONARY EMBOLISM (HCC): ICD-10-CM

## 2022-02-25 DIAGNOSIS — R11.0 NAUSEA: Primary | ICD-10-CM

## 2022-02-25 PROCEDURE — 99214 OFFICE O/P EST MOD 30 MIN: CPT | Performed by: NURSE PRACTITIONER

## 2022-02-25 RX ORDER — OMEPRAZOLE 40 MG/1
40 CAPSULE, DELAYED RELEASE ORAL
Qty: 30 CAPSULE | Refills: 0 | Status: SHIPPED | OUTPATIENT
Start: 2022-02-25 | End: 2022-03-04 | Stop reason: ALTCHOICE

## 2022-02-25 RX ORDER — ONDANSETRON 4 MG/1
4 TABLET, FILM COATED ORAL EVERY 8 HOURS PRN
Qty: 30 TABLET | Refills: 0 | Status: SHIPPED | OUTPATIENT
Start: 2022-02-25 | End: 2022-03-04 | Stop reason: ALTCHOICE

## 2022-02-25 SDOH — ECONOMIC STABILITY: FOOD INSECURITY: WITHIN THE PAST 12 MONTHS, YOU WORRIED THAT YOUR FOOD WOULD RUN OUT BEFORE YOU GOT MONEY TO BUY MORE.: NEVER TRUE

## 2022-02-25 SDOH — ECONOMIC STABILITY: FOOD INSECURITY: WITHIN THE PAST 12 MONTHS, THE FOOD YOU BOUGHT JUST DIDN'T LAST AND YOU DIDN'T HAVE MONEY TO GET MORE.: NEVER TRUE

## 2022-02-25 ASSESSMENT — ENCOUNTER SYMPTOMS
ABDOMINAL PAIN: 1
NAUSEA: 1
CONSTIPATION: 0
COUGH: 0
SORE THROAT: 0
DIARRHEA: 0
BLOOD IN STOOL: 0
SHORTNESS OF BREATH: 0

## 2022-02-25 ASSESSMENT — SOCIAL DETERMINANTS OF HEALTH (SDOH): HOW HARD IS IT FOR YOU TO PAY FOR THE VERY BASICS LIKE FOOD, HOUSING, MEDICAL CARE, AND HEATING?: NOT HARD AT ALL

## 2022-02-25 NOTE — PROGRESS NOTES
[address] [facility] [address] PRIMARY CARE  [address] [facility] [address]  Dept: [phone]  Dept Fax: [phone]     2/25/2022      Carlos Payton   1970     Chief Complaint   Patient presents with    Nausea     fatigue/cramps/ on period       HPI     Patient presents with complaint of nausea, fatigue, loss of appetite. Reports last Saturday night had a few alcoholic beverages for her birthday (4 [employer]); she woke up on Sunday not feeling well and assumed it was just a hangover. However, symptoms persisted into this past week; have not improved much since the onset. She did have some diarrhea and vomiting on Monday. Nausea has persisted but is no longer vomiting and is still eating 3 meals a day although she still does not have appetite. Also feels more fatigued than normal. Has some cramping. She is on her menses right now. Denies any URI symptoms. Denies sore throat. Took an at home covid test yesterday that was negative. In January had surgery for a bunionectomy. Developed bilateral PEs. Is currently taking Eliquis. She is to follow up with Dr. Nasrin Marie, her PCP, to discuss further hematology work-up. She has not yet set that appt up yet. PHQ Scores 1/4/2022 1/29/2020 5/28/2019   PHQ2 Score 0 0 0   PHQ9 Score 0 0 0     Interpretation of Total Score Depression Severity: 1-4 = Minimal depression, 5-9 = Mild depression, 10-14 = Moderate depression, 15-19 = Moderately severe depression, 20-27 = Severe depression     Prior to Visit Medications    Medication Sig Taking? Authorizing Provider   apixaban (ELIQUIS) 5 MG TABS tablet Take 1 tablet by mouth twice daily Yes [employer], DO   psyllium (KONSYL) 28.3 % PACK Take 1 packet by mouth daily Yes Historical Provider, MD   Glucosamine-Chondroit-Vit C-Mn (GLUCOSAMINE 1500 COMPLEX) CAPS Take 1 tablet by mouth Yes Historical Provider, MD   MAGNESIUM PO Take  by mouth daily.    Yes Historical Provider, MD Multiple Vitamin (MULTIVITAMIN PO) Take  by mouth daily. Yes Historical Provider, MD   LYSINE PO Take  by mouth daily. Yes Historical Provider, MD       Past Medical History:   Diagnosis Date    CTS (carpal tunnel syndrome)     Low back pain         Social History     Tobacco Use    Smoking status: Former Smoker     Packs/day: 0.50     Years: 15.00     Pack years: 7.50     Quit date: 2005     Years since quittin.0    Smokeless tobacco: Never Used   Vaping Use    Vaping Use: Never used   Substance Use Topics    Alcohol use: Yes     Comment: 2 X per month    Drug use: No        Past Surgical History:   Procedure Laterality Date    BUNIONECTOMY  12    left    WISDOM TOOTH EXTRACTION          No Known Allergies     Family History   Problem Relation Age of Onset    High Blood Pressure Mother     High Cholesterol Mother     Cancer Father 68        Lung cancer        Patient's past medical history, surgical history, family history, medications, and allergies  were all reviewed and updated as appropriate today. Review of Systems   Constitutional: Positive for appetite change and fatigue. Negative for chills, diaphoresis and fever. HENT: Negative for congestion, ear pain and sore throat. Respiratory: Negative for cough and shortness of breath. Cardiovascular: Negative for chest pain. Gastrointestinal: Positive for abdominal pain (cramping r/t menses) and nausea. Negative for blood in stool, constipation and diarrhea. Genitourinary: Negative for difficulty urinating and hematuria. Musculoskeletal: Negative for arthralgias and myalgias. Skin: Negative for rash. Neurological: Negative for dizziness, weakness and headaches. Hematological: Negative for adenopathy. Psychiatric/Behavioral: Negative.         BP (!) 115/58   Pulse 67   Temp 97.3 °F (36.3 °C)   Ht 5' 5\" (1.651 m)   Wt 195 lb (88.5 kg)   LMP 2022   SpO2 97%   BMI 32.45 kg/m²      Physical Exam  Constitutional:       General: She is not in acute distress. Appearance: Normal appearance. She is not ill-appearing. HENT:      Head: Normocephalic. Cardiovascular:      Rate and Rhythm: Normal rate and regular rhythm. Pulses: Normal pulses. Heart sounds: Normal heart sounds. Pulmonary:      Effort: Pulmonary effort is normal.      Breath sounds: Normal breath sounds. Abdominal:      General: Bowel sounds are normal. There is no distension. Palpations: Abdomen is soft. Tenderness: There is no abdominal tenderness. There is no guarding. Musculoskeletal:         General: Normal range of motion. Cervical back: Normal range of motion. Skin:     General: Skin is warm and dry. Neurological:      Mental Status: She is alert and oriented to person, place, and time. Psychiatric:         Mood and Affect: Mood normal.         Behavior: Behavior normal.         Assessment:  Encounter Diagnoses   Name Primary?  Nausea Yes    Loss of appetite     Bilateral pulmonary embolism (HCC)        Plan:  1. Nausea  2. Loss of appetite  -Acute symptoms of nausea, loss of appetite, fatigue after drinking multiple alcoholic beverages last weekend. Patient does not appear acutely ill. Discussed taking PPI for 2 weeks for possible gastris; may use zofran as needed for nausea. Recommended to avoid alcohol, citrus, caffeine, spicy foods, fatty foods and to only eat bland foods until symptoms improve. Patient to set up hospital follow up with Dr. Jere Sterling next week and if this issue is still persistent, can further evaluate at that time. Precautions discussed with patient and ; questions answered. Will set up follow up with PCP for next week. - omeprazole (PRILOSEC) 40 MG delayed release capsule; Take 1 capsule by mouth every morning (before breakfast)  Dispense: 30 capsule; Refill: 0  - ondansetron (ZOFRAN) 4 MG tablet;  Take 1 tablet by mouth every 8 hours as needed for Nausea or

## 2022-03-04 ENCOUNTER — OFFICE VISIT (OUTPATIENT)
Dept: PRIMARY CARE CLINIC | Age: 52
End: 2022-03-04
Payer: COMMERCIAL

## 2022-03-04 VITALS
OXYGEN SATURATION: 97 % | HEART RATE: 83 BPM | WEIGHT: 196.8 LBS | TEMPERATURE: 97.9 F | BODY MASS INDEX: 32.75 KG/M2 | DIASTOLIC BLOOD PRESSURE: 64 MMHG | SYSTOLIC BLOOD PRESSURE: 99 MMHG

## 2022-03-04 DIAGNOSIS — I26.99 BILATERAL PULMONARY EMBOLISM (HCC): Primary | ICD-10-CM

## 2022-03-04 PROCEDURE — 99214 OFFICE O/P EST MOD 30 MIN: CPT | Performed by: FAMILY MEDICINE

## 2022-03-04 NOTE — PROGRESS NOTES
[address] [facility] [address] PRIMARY CARE  [address] [place] [id number]  Dept: [phone]  Dept Fax: [phone]     3/4/2022      Sukhi Hammonds   1970     Chief Complaint   Patient presents with    Follow-Up from Hospital     blood clots   Raj VALENCIA    Pt comes in today for hospital follow up. Bunionectomy 2012. Was non-weightbearing and very minimal activity for ~ 10 days. Presented to ER on 22 with acute onset chest discomfort and calf pain/swelling. Impression       Very extensive bilateral pulmonary emboli.       Right upper lobe infiltration which may represent pneumonia.       Inferior lingular infiltration which may represent pneumonia or    pulmonary infarct.             Does not take any hormonal supplements. No h/o blood clots. They did not do dopplers of LE. Started on Eliquis and discharged home 22. Prior to Visit Medications    Medication Sig Taking? Authorizing Provider   apixaban (ELIQUIS) 5 MG TABS tablet Take 1 tablet by mouth twice daily Yes Piotr Arvizu,    psyllium (KONSYL) 28.3 % PACK Take 1 packet by mouth daily Yes Historical Provider, MD   Glucosamine-Chondroit-Vit C-Mn (GLUCOSAMINE 1500 COMPLEX) CAPS Take 1 tablet by mouth Yes Historical Provider, MD   MAGNESIUM PO Take  by mouth daily. Yes Historical Provider, MD   Multiple Vitamin (MULTIVITAMIN PO) Take  by mouth daily. Yes Historical Provider, MD   LYSINE PO Take  by mouth daily.    Yes Historical Provider, MD        Past Medical History:   Diagnosis Date    CTS (carpal tunnel syndrome)     Low back pain     [address]monary embol[address] [address] 2022        Social History     Tobacco Use    Smoking status: Former Smoker     Packs/day: 0.50     Years: 15.00     Pack years: 7.50     Types: Cigarettes     Start date: 1992     Quit date: 3/24/2007     Years since quittin.9    Smokeless tobacco: Never Used   Vaping Use    Vaping Use: Never used   Substance Use Topics    Alcohol use: Yes     Alcohol/week: 1.0 standard drink     Types: 1 Standard drinks or equivalent per week     Comment: Do not drink every week, maybe once a month.  Drug use: Never        Past Surgical History:   Procedure Laterality Date    BUNIONECTOMY  1/30/12    left    WISDOM TOOTH EXTRACTION          No Known Allergies     Family History   Problem Relation Age of Onset    High Blood Pressure Mother         Very low medication    High Cholesterol Mother         Very low medication    Cancer Father 68        Lung cancer        Patient's past medical history, surgical history, family history, medications, and allergies  were all reviewed and updated as appropriate today. Review of Systems   Constitutional: Negative for fever. Respiratory: Negative for cough and shortness of breath. Cardiovascular: Negative for chest pain. BP 99/64 (Cuff Size: Medium Adult)   Pulse 83   Temp 97.9 °F (36.6 °C) (Temporal)   Wt 196 lb 12.8 oz (89.3 kg)   LMP 02/21/2022   SpO2 97% Comment: room air  Breastfeeding No   BMI 32.75 kg/m²      Physical Exam  Vitals reviewed. Constitutional:       General: She is not in acute distress. Appearance: Normal appearance. She is well-developed. HENT:      Head: Normocephalic. Eyes:      General: No scleral icterus. Conjunctiva/sclera: Conjunctivae normal.   Neck:      Thyroid: No thyromegaly. Cardiovascular:      Rate and Rhythm: Normal rate and regular rhythm. Heart sounds: No murmur heard. Pulmonary:      Effort: Pulmonary effort is normal. No respiratory distress. Breath sounds: Normal breath sounds. Abdominal:      General: Bowel sounds are normal. There is no distension. Palpations: Abdomen is soft. Tenderness: There is no abdominal tenderness. Musculoskeletal:      Cervical back: Neck supple. Right lower leg: No edema. Left lower leg: No edema.    Lymphadenopathy: Cervical: No cervical adenopathy. Skin:     General: Skin is warm and dry. Capillary Refill: Capillary refill takes less than 2 seconds. Neurological:      General: No focal deficit present. Mental Status: She is alert and oriented to person, place, and time. Mental status is at baseline. Psychiatric:         Mood and Affect: Mood normal.         Assessment:  Encounter Diagnosis   Name Primary?  Bilateral pulmonary embolism ([address] Yes       Plan:    - 6 months Eliquis. Given the extensive nature of the PE, recommended hypercoag workup after finishing treatment. Will address at physical this summer. New Prescriptions    No medications on file        No orders of the defined types were placed in this encounter. Return in about 5 months (around 8/1/2022) for Follow up PE.       Ami Mancera DO

## 2022-03-04 NOTE — PATIENT INSTRUCTIONS
[facility] Laboratory Locations - No appointment necessary. @ indicates the location is open Saturdays in addition to Monday through Friday. Call your preferred location for test preparation, business hours and other information you need. [employer] accepts [employer]. [facility]    @ [place] [facility] Svcs. 3 [facility] [address] [place] [address]. [place], [facility] [address]   Ph: [phone] [facility] Svcs. [address]   Ph: [phone]  @ [facility] Dials Lab Svcs. 3[address] [facility]   [facility] [place]   Ph: [phone]    [facility] Lab Svcs. [address] [facility] [address]   Ph: [phone] @ [place] Lab Svcs. [address]  Ph: [phone] @ [facility] MOB Lab Svcs. [address] [address]   Ph: [phone]    [place]   @ [facility] Lab Svcs. [address] [place], [place] [address]   Ph: [phone] [facility] Bldg. [address]  Ph: [phone] [facility], [phone]   [address] Lab Svcs. 54 [facility]   Ph: [phone] [facility]. Lab Svcs.   [address]   Ph: [phone]

## 2022-03-09 ASSESSMENT — ENCOUNTER SYMPTOMS
SHORTNESS OF BREATH: 0
COUGH: 0

## 2022-05-13 ENCOUNTER — TELEPHONE (OUTPATIENT)
Dept: SURGERY | Age: 52
End: 2022-05-13

## 2022-05-13 RX ORDER — POLYETHYLENE GLYCOL 3350, SODIUM SULFATE ANHYDROUS, SODIUM BICARBONATE, SODIUM CHLORIDE, POTASSIUM CHLORIDE 236; 22.74; 6.74; 5.86; 2.97 G/4L; G/4L; G/4L; G/4L; G/4L
POWDER, FOR SOLUTION ORAL
Qty: 1 EACH | Refills: 0 | Status: SHIPPED | OUTPATIENT
Start: 2022-05-13 | End: 2022-08-02 | Stop reason: ALTCHOICE

## 2022-05-13 NOTE — TELEPHONE ENCOUNTER
Patient has been scheduled for:    Procedure: Colonoscopy   Date: 7/6/2022  Time: 8:30 AM   Arrival: 7:00 AM   [facility]   ASA?: Eliquis, 3 day hold   Prep? Anjelica, reviewed on phone and emailed    Pre-op? Day of by Dr. Jet Marsh? N/A     Patient advised they will need a . Orders faxed to surgery scheduling. Instructions have been emailed to: [email or web address]

## 2022-06-20 ENCOUNTER — TELEPHONE (OUTPATIENT)
Dept: SURGERY | Age: 52
End: 2022-06-20

## 2022-06-20 NOTE — TELEPHONE ENCOUNTER
Pt called needing to reschedule colonoscopy on 7/6/22. Pt was rescheduled to 8/24/22. Schedule change was faxed to scheduling and [place] calendar was updated.

## 2022-08-02 ENCOUNTER — OFFICE VISIT (OUTPATIENT)
Dept: PRIMARY CARE CLINIC | Age: 52
End: 2022-08-02
Payer: COMMERCIAL

## 2022-08-02 VITALS
DIASTOLIC BLOOD PRESSURE: 64 MMHG | HEART RATE: 74 BPM | HEIGHT: 65 IN | SYSTOLIC BLOOD PRESSURE: 106 MMHG | OXYGEN SATURATION: 98 % | WEIGHT: 190.6 LBS | TEMPERATURE: 97.3 F | BODY MASS INDEX: 31.75 KG/M2

## 2022-08-02 DIAGNOSIS — I26.99 BILATERAL PULMONARY EMBOLISM (HCC): ICD-10-CM

## 2022-08-02 DIAGNOSIS — Z01.419 WELL WOMAN EXAM WITH ROUTINE GYNECOLOGICAL EXAM: Primary | ICD-10-CM

## 2022-08-02 DIAGNOSIS — Z12.83 SKIN CANCER SCREENING: ICD-10-CM

## 2022-08-02 PROCEDURE — 99396 PREV VISIT EST AGE 40-64: CPT | Performed by: FAMILY MEDICINE

## 2022-08-02 NOTE — PATIENT INSTRUCTIONS
[facility] Laboratory Locations - No appointment necessary. @ indicates the location is open Saturdays in addition to Monday through Friday. Call your preferred location for test preparation, business hours and other information you need. [employer] accepts [employer]. [facility]    @ [place] Lab Svcs. 3 [facility] [address] [place] [address]. [place], [address]   Ph: [phone] [facility] MOB Lab Svcs. [address]   Ph: [phone]  @ [place] Lab Svcs. 3157 [facility]   Ph: [phone]    [facility] Lab Svcs. [address]   Ph: [phone] @ [place] Lab Svcs. [address]  Ph: [phone] @ [place] MOB Lab Svcs. [address] [facility] [address]   Ph: [phone]     [place] [facility] Svcs. [place] [address]  Ph: [phone]    [place]   @ [place] Lab Svcs. [address] [place], [place] [address]   Ph: [phone] [place] [facility] [address] [place] [address]  Ph: [address] [facility] 95, [address] [place] [address]:  [address] Lab Svcs[address] [facility]   Ph: [phone] [facility]. Lab Svcs.   211 [facility], [facility] [address]   Ph: [phone]

## 2022-08-02 NOTE — PROGRESS NOTES
[address] [facility] [address] PRIMARY CARE  [address] [place] [address]  Dept: [phone]  Dept Fax: [phone]     8/2/2022      Fern Camejo   1970     Chief Complaint   Patient presents with    Annual Exam     pap       HPI    Pt comes in today for annual physical with pap. Colonoscopy next month. Pap today. Mammogram 1/2022. Recently completed 6 months of anticoagulation for extensive bilateral PE post bunionectomy. Has not had any follow up imaging. Prior to Visit Medications    Medication Sig Taking? Authorizing Provider   psyllium (KONSYL) 28.3 % PACK Take 1 packet by mouth daily Yes Historical Provider, MD   Glucosamine-Chondroit-Vit C-Mn (GLUCOSAMINE 1500 COMPLEX) CAPS Take 1 tablet by mouth Yes Historical Provider, MD   MAGNESIUM PO Take  by mouth daily. Yes Historical Provider, MD   Multiple Vitamin (MULTIVITAMIN PO) Take  by mouth daily. Yes Historical Provider, MD   LYSINE PO Take  by mouth daily. Yes Historical Provider, MD   polyethylene glycol (GOLYTELY) 236 g solution Prepare solution according to packaging instructions. Patient not taking: Reported on 8/2/2022  Dulce Aguirre MD   apixaban [facility]) 5 MG TABS tablet Take 1 tablet by mouth twice daily  Patient not taking: Reported on 8/2/2022  [facility],         Past Medical History:   Diagnosis Date    CTS (carpal tunnel syndrome)     Pulmonary embolism ([facility] [address] 01/30/2022        Social History     Tobacco Use    Smoking status: Former     Packs/day: 0.50     Years: 15.00     Pack years: 7.50     Types: Cigarettes     Start date: 8/1/1992     Quit date: 3/24/2007     Years since quitting: 15.3    Smokeless tobacco: Never   Vaping Use    Vaping Use: Never used   Substance Use Topics    Alcohol use: Yes     Alcohol/week: 1.0 standard drink     Types: 1 Standard drinks or equivalent per week     Comment: Do not drink every week, maybe once a month.     Drug use: Never Past Surgical History:   Procedure Laterality Date    BUNIONECTOMY  1/30/12    left    WISDOM TOOTH EXTRACTION          No Known Allergies     Family History   Problem Relation Age of Onset    High Blood Pressure Mother         Very low medication    High Cholesterol Mother         Very low medication    Cancer Father 68        Lung cancer        Patient's past medical history, surgical history, family history, medications, and allergies  were all reviewed and updated as appropriate today. Review of Systems   Constitutional:  Negative for chills, fever and unexpected weight change. Respiratory:  Negative for shortness of breath. Cardiovascular:  Negative for chest pain. Gastrointestinal:  Negative for abdominal pain, diarrhea, nausea and vomiting. /64 (Cuff Size: Medium Adult)   Pulse 74   Temp 97.3 °F (36.3 °C) (Temporal)   Ht 5' 4.5\" (1.638 m)   Wt 190 lb 9.6 oz (86.5 kg)   LMP 07/20/2022   SpO2 98% Comment: room air  Breastfeeding No   BMI 32.21 kg/m²      Physical Exam  Vitals reviewed. Exam conducted with a chaperone present. Constitutional:       General: She is not in acute distress. Appearance: She is well-developed. She is not ill-appearing. HENT:      Head: Normocephalic. Eyes:      General: No scleral icterus. Conjunctiva/sclera: Conjunctivae normal.   Neck:      Thyroid: No thyromegaly. Cardiovascular:      Rate and Rhythm: Normal rate and regular rhythm. Heart sounds: No murmur heard. Pulmonary:      Effort: Pulmonary effort is normal. No respiratory distress. Breath sounds: Normal breath sounds. Chest:   Breasts:     Right: No mass or skin change. Left: No mass or skin change. Abdominal:      General: There is no distension. Palpations: Abdomen is soft. There is no mass. Tenderness: There is no abdominal tenderness. Genitourinary:     General: Normal vulva.       Vagina: Normal.      Cervix: No cervical motion tenderness, discharge or friability. Adnexa:         Right: No fullness. Left: No fullness. Musculoskeletal:      Cervical back: Neck supple. Right lower leg: No edema. Left lower leg: No edema. Lymphadenopathy:      Cervical: No cervical adenopathy. Skin:     General: Skin is warm and dry. Capillary Refill: Capillary refill takes less than 2 seconds. Neurological:      General: No focal deficit present. Mental Status: She is alert and oriented to person, place, and time. Mental status is at baseline. Psychiatric:         Mood and Affect: Mood normal.       Assessment:  Encounter Diagnoses   Name Primary? Well woman exam with routine gynecological exam Yes    Skin cancer screening     Bilateral pulmonary embolism ([facility] [address]        Plan:    - Derm referral as requested. - CBE/pap today. - Recommended hematology eval given extensive PE after minor surgery. Recommend hypercoag workup. - Fasting labs.      New Prescriptions    No medications on file        Orders Placed This Encounter   Procedures    PAP SMEAR    Comprehensive Metabolic Panel    Lipid, Fasting    Human papillomavirus (HPV) DNA probe thin prep high risk    [facility] D[facility]atology    [facility] - Amilcar Diallo MD, Oncology, Carolynnce Netter        Return in about 1 year (around 8/2/2023) for Yearly physical.         Justo Serrato DO

## 2022-08-03 LAB
HPV COMMENT: NORMAL
HPV TYPE 16: NOT DETECTED
HPV TYPE 18: NOT DETECTED
HPVOH (OTHER TYPES): NOT DETECTED

## 2022-08-04 ASSESSMENT — ENCOUNTER SYMPTOMS
DIARRHEA: 0
ABDOMINAL PAIN: 0
SHORTNESS OF BREATH: 0
NAUSEA: 0
VOMITING: 0

## 2022-08-05 NOTE — RESULT ENCOUNTER NOTE
Your recent test results are all normal. Please don't hesitate to contact the office with any additional questions/concerns - Dr. Cook

## 2022-08-18 DIAGNOSIS — Z12.11 ENCOUNTER FOR SCREENING COLONOSCOPY: Primary | ICD-10-CM

## 2022-08-18 RX ORDER — POLYETHYLENE GLYCOL 3350, SODIUM CHLORIDE, SODIUM BICARBONATE, POTASSIUM CHLORIDE 420; 11.2; 5.72; 1.48 G/4L; G/4L; G/4L; G/4L
POWDER, FOR SOLUTION ORAL
Qty: 1 EACH | Refills: 0 | Status: SHIPPED | OUTPATIENT
Start: 2022-08-18 | End: 2022-08-31

## 2022-08-23 ENCOUNTER — TELEPHONE (OUTPATIENT)
Dept: SURGERY | Age: 52
End: 2022-08-23

## 2022-08-23 ENCOUNTER — ANESTHESIA EVENT (OUTPATIENT)
Dept: ENDOSCOPY | Age: 52
End: 2022-08-23
Payer: COMMERCIAL

## 2022-08-23 NOTE — PROGRESS NOTES
ENDOSCOPY PREOP INSTRUCTIONS      You are scheduled for a procedure at [facility] on 8-24 @ 800. You will need to arrival by: 630 (at least an hour & a half prior to planned start time)  Report to the MAIN entrance on [address] and register at the surgery center on the left-hand side of the lobby  You will need your insurance card and photo id    For your procedure:     PLEASE FOLLOW ALL INSTRUCTIONS & PREPS GIVEN TO YOU BY YOUR DOCTOR'S OFFICE. If you have not received these instructions yet, please call the office immediately. Make sure to read them as soon as received. Bring an accurate list of any medications, including the dose/ frequency, with you on the day of the procedure. Make sure to include over the counter medications. If you are taking blood thinners, Aspirin or diabetic medication, make sure to call your doctor as soon as possible for instructions prior to your procedure. Please dress comfortably and do not wear any lotion, powders or jewelry  Arrange for someone to be with you and sign you out & drive you home after your procedure. We allow 2 adults visitors with you in the hospital & everyone is required to wear a mask.     WOMEN ONLY OF CHILDBEARING AGE: Please make sure to be able to give a urine sample on arrival      If you have further questions, you may contact your Endoscopist's office or Pre Admission Testing staff at [phone]

## 2022-08-23 NOTE — TELEPHONE ENCOUNTER
I have placed a reminder call to patient for upcoming procedure. Did you speak directly to patient or leave a voicemail? Spoke to patient     Prep? NPO 12AM  Colonoscopy prep    Must have a  that is over the age of 25. Must be a friend or family member that can be responsible for signing them out after surgery.     Arrive at the main entrance [facility] at 6:30AM

## 2022-08-24 ENCOUNTER — ANESTHESIA (OUTPATIENT)
Dept: ENDOSCOPY | Age: 52
End: 2022-08-24
Payer: COMMERCIAL

## 2022-08-24 ENCOUNTER — HOSPITAL ENCOUNTER (OUTPATIENT)
Age: 52
Setting detail: OUTPATIENT SURGERY
Discharge: HOME | End: 2022-08-24
Attending: SURGERY | Admitting: SURGERY
Payer: COMMERCIAL

## 2022-08-24 VITALS
RESPIRATION RATE: 16 BRPM | HEART RATE: 74 BPM | TEMPERATURE: 97 F | SYSTOLIC BLOOD PRESSURE: 136 MMHG | DIASTOLIC BLOOD PRESSURE: 78 MMHG | OXYGEN SATURATION: 100 %

## 2022-08-24 PROBLEM — Z12.11 ENCOUNTER FOR SCREENING COLONOSCOPY: Status: ACTIVE | Noted: 2022-08-24

## 2022-08-24 LAB — PREGNANCY, URINE: NEGATIVE

## 2022-08-24 PROCEDURE — 2580000003 HC RX 258: Performed by: ANESTHESIOLOGY

## 2022-08-24 PROCEDURE — 2709999900 HC NON-CHARGEABLE SUPPLY: Performed by: SURGERY

## 2022-08-24 PROCEDURE — 3700000001 HC ADD 15 MINUTES (ANESTHESIA): Performed by: SURGERY

## 2022-08-24 PROCEDURE — 45378 DIAGNOSTIC COLONOSCOPY: CPT | Performed by: SURGERY

## 2022-08-24 PROCEDURE — 3609027000 HC COLONOSCOPY: Performed by: SURGERY

## 2022-08-24 PROCEDURE — 84703 CHORIONIC GONADOTROPIN ASSAY: CPT

## 2022-08-24 PROCEDURE — 6360000002 HC RX W HCPCS: Performed by: NURSE ANESTHETIST, CERTIFIED REGISTERED

## 2022-08-24 PROCEDURE — 7100000011 HC PHASE II RECOVERY - ADDTL 15 MIN: Performed by: SURGERY

## 2022-08-24 PROCEDURE — 3700000000 HC ANESTHESIA ATTENDED CARE: Performed by: SURGERY

## 2022-08-24 PROCEDURE — 7100000010 HC PHASE II RECOVERY - FIRST 15 MIN: Performed by: SURGERY

## 2022-08-24 RX ORDER — SODIUM CHLORIDE 9 MG/ML
INJECTION, SOLUTION INTRAVENOUS PRN
Status: DISCONTINUED | OUTPATIENT
Start: 2022-08-24 | End: 2022-08-24 | Stop reason: HOSPADM

## 2022-08-24 RX ORDER — PROPOFOL 10 MG/ML
INJECTION, EMULSION INTRAVENOUS PRN
Status: DISCONTINUED | OUTPATIENT
Start: 2022-08-24 | End: 2022-08-24 | Stop reason: SDUPTHER

## 2022-08-24 RX ORDER — LIDOCAINE HYDROCHLORIDE 20 MG/ML
INJECTION, SOLUTION INTRAVENOUS PRN
Status: DISCONTINUED | OUTPATIENT
Start: 2022-08-24 | End: 2022-08-24 | Stop reason: SDUPTHER

## 2022-08-24 RX ORDER — SODIUM CHLORIDE 0.9 % (FLUSH) 0.9 %
5-40 SYRINGE (ML) INJECTION EVERY 12 HOURS SCHEDULED
Status: DISCONTINUED | OUTPATIENT
Start: 2022-08-24 | End: 2022-08-24 | Stop reason: HOSPADM

## 2022-08-24 RX ORDER — SODIUM CHLORIDE 0.9 % (FLUSH) 0.9 %
5-40 SYRINGE (ML) INJECTION PRN
Status: DISCONTINUED | OUTPATIENT
Start: 2022-08-24 | End: 2022-08-24 | Stop reason: HOSPADM

## 2022-08-24 RX ORDER — SODIUM CHLORIDE, SODIUM LACTATE, POTASSIUM CHLORIDE, CALCIUM CHLORIDE 600; 310; 30; 20 MG/100ML; MG/100ML; MG/100ML; MG/100ML
INJECTION, SOLUTION INTRAVENOUS CONTINUOUS
Status: DISCONTINUED | OUTPATIENT
Start: 2022-08-24 | End: 2022-08-24 | Stop reason: HOSPADM

## 2022-08-24 RX ADMIN — PROPOFOL 80 MG: 10 INJECTION, EMULSION INTRAVENOUS at 08:04

## 2022-08-24 RX ADMIN — PROPOFOL 30 MG: 10 INJECTION, EMULSION INTRAVENOUS at 08:10

## 2022-08-24 RX ADMIN — SODIUM CHLORIDE, POTASSIUM CHLORIDE, SODIUM LACTATE AND CALCIUM CHLORIDE: 600; 310; 30; 20 INJECTION, SOLUTION INTRAVENOUS at 07:21

## 2022-08-24 RX ADMIN — PROPOFOL 20 MG: 10 INJECTION, EMULSION INTRAVENOUS at 08:12

## 2022-08-24 RX ADMIN — PROPOFOL 20 MG: 10 INJECTION, EMULSION INTRAVENOUS at 08:09

## 2022-08-24 RX ADMIN — PROPOFOL 40 MG: 10 INJECTION, EMULSION INTRAVENOUS at 08:05

## 2022-08-24 RX ADMIN — PROPOFOL 30 MG: 10 INJECTION, EMULSION INTRAVENOUS at 08:07

## 2022-08-24 RX ADMIN — LIDOCAINE HYDROCHLORIDE 80 MG: 20 INJECTION, SOLUTION INTRAVENOUS at 08:04

## 2022-08-24 ASSESSMENT — PAIN - FUNCTIONAL ASSESSMENT
PAIN_FUNCTIONAL_ASSESSMENT: 0-10
PAIN_FUNCTIONAL_ASSESSMENT: 0-10

## 2022-08-24 ASSESSMENT — PAIN SCALES - GENERAL: PAINLEVEL_OUTOF10: 0

## 2022-08-24 ASSESSMENT — LIFESTYLE VARIABLES: SMOKING_STATUS: 0

## 2022-08-24 NOTE — DISCHARGE INSTRUCTIONS
WHAT TO EXPECT AFTER YOUR COLONOSCOPY - DR. NEW          The nurses will watch you in the recovery area for 1 to 2 hours until the medicines wear off. Then you can go home. You will need a ride. You can resume a normal diet after the procedure. You are legally not allowed to drive or operate machinery after the procedure, as you will have received sedation. You should avoid alcohol until the next day. Do not plan on going back to work after your procedure. If you are on any blood thinners, such as aspirin, Plavix, Coumadin, Xarelto, Eliquis, etc., be sure to ask your physician when you may resume these. This will depend on the reason for the medication usage, and if any polyps were removed during the procedure. All of your other medications you can resume normally. Your doctor will talk to you about when you will need your next colonoscopy. This will depend on the results of the colonoscopy and your medical and family history. Complications:    Colonoscopy is generally a very safe procedure with low complication risk. Common complaints after the procedure include bloating and excessive flatulence, and occasionally nausea. This is normal.     Other complications include: Anesthesia/sedation issues  Bleeding, especially if polyps are removed (uncommon)  Most bleeding will stop on its own, but occasionally can require a another colonoscopy, blood products, or hospital admission  This risk is slightly higher in patients on blood thinners. Perforation, or a hole in the colon, which can require hospital admission or emergency surgery (very rare)    Call the office [phone] or go to your nearest emergency room if you have fever greater than 101º, severe abdominal pain that does not get better after a few hours, or rectal bleeding that does not stop after a few hours. You may also call the office with any non-emergency questions or concerns.      Follow-up care is a key part of your treatment and safety:    If polyps or other abnormalities are found, tissue samples will be sent to the pathology lab to be examined. Results are usually mailed to your address on file in 1 to 2 weeks. Occasionally, we may call you with results. If you do not hear from us, please CALL US and ask for your results: [phone]. Sometimes these results will determine when your next colonoscopy is recommended. Be sure to communicate with your primary care physician about the results of your colonoscopy as well.

## 2022-08-24 NOTE — H&P
PRE-ENDOSCOPY H&P    Visit Date: 8/24/2022    History:     Juan Liu is a 50 y.o. female who presents today for colonoscopy procedure. See A/P below for indications. Patient Active Problem List   Diagnosis    Lumbar strain    Low back pain    CTS (carpal tunnel syndrome)    Bilateral pulmonary embolism (HCC)     Scheduled Meds:   sodium chloride flush  5-40 mL IntraVENous 2 times per day     Continuous Infusions:   lactated ringers      lactated ringers 125 mL/hr at 08/24/22 0759    sodium chloride       PRN Meds:.sodium chloride flush, sodium chloride  Prior to Admission medications    Medication Sig Start Date End Date Taking? Authorizing Provider   polyethylene glycol-electrolytes (NULYTELY) 420 g solution TAKE AS DIRECTED DAY PRIOR TO COLONOSCOPY 8/18/22   Randell Jesus MD   psyllium (KONSYL) 28.3 % PACK Take 1 packet by mouth daily    Historical Provider, MD   Glucosamine-Chondroit-Vit C-Mn (GLUCOSAMINE 1500 COMPLEX) CAPS Take 1 tablet by mouth    Historical Provider, MD   MAGNESIUM PO Take  by mouth daily. Historical Provider, MD   Multiple Vitamin (MULTIVITAMIN PO) Take  by mouth daily. Historical Provider, MD   LYSINE PO Take  by mouth daily. Historical Provider, MD     No Known Allergies  Past Medical History:   Diagnosis Date    CTS (carpal tunnel syndrome)     Pulmonary embolism ([facility] [address] 01/30/2022     Past Surgical History:   Procedure Laterality Date    BUNIONECTOMY  1/30/12    left    WISDOM TOOTH EXTRACTION           Physical Exam:     /69   Pulse 80   Temp 97.2 °F (36.2 °C) (Temporal)   Resp 16   SpO2 100%  There is no height or weight on file to calculate BMI. Constitutional: Appears well-developed and well-nourished. Grooming appropriate. Head: Normocephalic, atraumatic. Eyes: No scleral icterus. Vision intact grossly. ENT: Hearing grossly intact. No facial deformity. Cardiovascular: Normal rate on monitor.   Pulmonary/Chest: Effort normal. No respiratory distress. No wheezes. No use of accessory muscles. Musculoskeletal: Normal range of motion of UE. No gross deformity. Neurological: Alert and oriented to person, place, and time. No gross deficits. Psychiatric: Normal mood and affect. Behavior normal. Oriented to person, place, and time. Abdomen: soft, NTTP, non distended    Recent labs/radiology reviewed as appropriate    Assessment/Plan:     Referred by PCP for screening colonoscopy    Previous colonoscopy: no  Family history of colorectal cancer: no  Symptoms: no    Anesthesia to provide sedation. Please see their documentation regarding airway and ASA classification. Proceed as planned for endoscopy, possible polypectomy    Risks/benefits/alternatives of procedure discussed with patient and any present family members. Risks including, but not limited to: bleeding, perforation, post polypectomy syndrome, splenic injury, need for additional procedures or surgery, risks of anesthesia. Patient understands it is their responsibility to call office for pathology results if they do not hear from my office within 1-2 weeks. All questions answered.     Electronically signed by Sly Jeffery MD on 8/24/2022 at 8:01 AM

## 2022-08-24 NOTE — PROGRESS NOTES
Ambulatory Surgery/Procedure Discharge Note    Patient tolerated procedure well. Patient denies nausea, cramping or pain post procedure. Discharge instructions and education reviewed with patient and spouse. Written instructions provided at discharge. Patient discharged ambulatory in wheelchair to car. Spouse to drive pt home. Vitals:    08/24/22 0846   BP: 136/78   Pulse: 74   Resp: 16   Temp:    SpO2: 100%       In: 370 [P.O.:120; I.V.:250]  Out: -     Restroom use offered before discharge. Yes    Pain assessment:  none  Pain Level: 0        Patient discharged to home/self care.  Patient discharged via wheel chair by transporter to waiting family/S.O.       8/24/2022 9:08 AM

## 2022-08-24 NOTE — ANESTHESIA PRE PROCEDURE
Department of Anesthesiology  Preprocedure Note       Name:  Katelynn Garvey   Age:  50 y.o.  :  1970                                          MRN:  [id number]         Date:  2022      Surgeon: Lesli Paige):  Asif Martínez MD    Procedure: Procedure(s):  COLONOSCOPY, POSSIBLE POLYPECTOMY    Medications prior to admission:   Prior to Admission medications    Medication Sig Start Date End Date Taking? Authorizing Provider   polyethylene glycol-electrolytes (NULYTELY) 420 g solution TAKE AS DIRECTED DAY PRIOR TO COLONOSCOPY 22   Asif Martínez MD   psyllium (KONSYL) 28.3 % PACK Take 1 packet by mouth daily    Historical Provider, MD   Glucosamine-Chondroit-Vit C-Mn (GLUCOSAMINE 1500 COMPLEX) CAPS Take 1 tablet by mouth    Historical Provider, MD   MAGNESIUM PO Take  by mouth daily. Historical Provider, MD   Multiple Vitamin (MULTIVITAMIN PO) Take  by mouth daily. Historical Provider, MD   LYSINE PO Take  by mouth daily.       Historical Provider, MD       Current medications:    Current Facility-Administered Medications   Medication Dose Route Frequency Provider Last Rate Last Admin    lactated ringers infusion   IntraVENous Continuous Bernette Para, DO        lactated ringers infusion   IntraVENous Continuous Paige Au  mL/hr at 22 0721 New Bag at 22 0721    sodium chloride flush 0.9 % injection 5-40 mL  5-40 mL IntraVENous 2 times per day Paige Au MD        sodium chloride flush 0.9 % injection 5-40 mL  5-40 mL IntraVENous PRN Paige Au MD        0.9 % sodium chloride infusion   IntraVENous PRN Paige Au MD           Allergies:  No Known Allergies    Problem List:    Patient Active Problem List   Diagnosis Code    Lumbar strain S39.012A    Low back pain M54.50    CTS (carpal tunnel syndrome) G56.00    Bilateral pulmonary embolism (HCC) I26.99       Past Medical History:        Diagnosis Date    CTS (carpal tunnel syndrome)     [facility]       Past Surgical History:        Procedure Laterality Date    BUNIONECTOMY  1/30/12    left    WISDOM TOOTH EXTRACTION         Social History:    Social History     Tobacco Use    Smoking status: Former     Packs/day: 0.50     Years: 15.00     Pack years: 7.50     Types: Cigarettes     Start date: 8/1/1992     Quit date: 3/24/2007     Years since quitting: 15.4    Smokeless tobacco: Never   Substance Use Topics    Alcohol use: Yes     Alcohol/week: 1.0 standard drink     Types: 1 Standard drinks or equivalent per week     Comment: Do not drink every week, maybe once a month.                                 Counseling given: Not Answered      Vital Signs (Current):   Vitals:    08/24/22 0647   BP: 131/69   Pulse: 80   Resp: 16   Temp: 97.2 °F (36.2 °C)   TempSrc: Temporal   SpO2: 100%                                              BP Readings from Last 3 Encounters:   08/24/22 131/69   08/02/22 106/64   03/04/22 99/64       NPO Status: Time of last liquid consumption: 2300                        Time of last solid consumption: 2100                        Date of last liquid consumption: 08/23/22                        Date of last solid food consumption: 08/23/22    BMI:   Wt Readings from Last 3 Encounters:   08/02/22 190 lb 9.6 oz (86.5 kg)   03/04/22 196 lb 12.8 oz (89.3 kg)   02/25/22 195 lb (88.5 kg)     There is no height or weight on file to calculate BMI.    CBC:   Lab Results   Component Value Date/Time    WBC 8.0 01/31/2022 05:48 AM    RBC 3.94 01/31/2022 05:48 AM    HGB 12.0 01/31/2022 05:48 AM    HCT 35.6 01/31/2022 05:48 AM    MCV 90.4 01/31/2022 05:48 AM    RDW 14.8 01/31/2022 05:48 AM     01/31/2022 05:48 AM       CMP:   Lab Results   Component Value Date/Time     01/31/2022 05:48 AM    K 3.9 01/31/2022 05:48 AM     01/31/2022 05:48 AM    CO2 26 01/31/2022 05:48 AM    BUN 9 01/31/2022 05:48 AM    CREATININE 0.5 01/31/2022 05:48 AM    GFRAA >60 01/31/2022 05:48 AM    AGRATIO 1.1 01/30/2022 04:39 AM    LABGLOM >60 01/31/2022 05:48 AM    GLUCOSE 99 01/31/2022 05:48 AM    PROT 7.7 01/30/2022 04:39 AM    CALCIUM 8.7 01/31/2022 05:48 AM    BILITOT 0.3 01/30/2022 04:39 AM    ALKPHOS 76 01/30/2022 04:39 AM    AST 14 01/30/2022 04:39 AM    ALT 9 01/30/2022 04:39 AM       POC Tests: No results for input(s): POCGLU, POCNA, POCK, POCCL, POCBUN, POCHEMO, POCHCT in the last 72 hours. Coags: No results found for: PROTIME, INR, APTT    HCG (If Applicable):   Lab Results   Component Value Date    PREGTESTUR Negative 08/24/2022        ABGs: No results found for: PHART, PO2ART, WQF2QCS, BIM1ZNA, BEART, B2TGAQOG     Type & Screen (If Applicable):  No results found for: LABABO, LABRH    Drug/Infectious Status (If Applicable):  No results found for: HIV, HEPCAB    COVID-19 Screening (If Applicable):   Lab Results   Component Value Date/Time    COVID19 Not Detected 01/30/2022 06:31 AM           Anesthesia Evaluation  Patient summary reviewed and Nursing notes reviewed no history of anesthetic complications:   Airway: Mallampati: II  TM distance: >3 FB   Neck ROM: full  Mouth opening: > = 3 FB   Dental: normal exam         Pulmonary: breath sounds clear to auscultation      (-) not a current smoker (quit 2007)                           Cardiovascular:  Exercise tolerance: good (>4 METS),       (-) past MI    NYHA Classification: II    Rhythm: regular  Rate: normal           Beta Blocker:  Not on Beta Blocker         Neuro/Psych:               GI/Hepatic/Renal:   (+) bowel prep,      (-) GERD       Endo/Other: Negative Endo/Other ROS                    Abdominal:             Vascular:   + PE (last yr off coumadin x 6 months ). Other Findings:           Anesthesia Plan      MAC     ASA 1       Induction: intravenous. MIPS: Prophylactic antiemetics administered. Anesthetic plan and risks discussed with patient.       Plan discussed with

## 2022-08-24 NOTE — ANESTHESIA POSTPROCEDURE EVALUATION
Department of Anesthesiology  Postprocedure Note    Patient: Bria Severino  MRN: [id number]  YOB: 1970  Date of evaluation: 8/24/2022      Procedure Summary     Date: 08/24/22 Room / Location: [facility]    Anesthesia Start: 3729 Anesthesia Stop: 0379    Procedure: COLONOSCOPY, POSSIBLE POLYPECTOMY Diagnosis:       Colon cancer screening      (Colon cancer screening [Z12.11])    Surgeons: Ivet Starks MD Responsible Provider: Pat Bee DO    Anesthesia Type: MAC ASA Status: 1          Anesthesia Type: MAC    Triston Phase I: Triston Score: 10    Triston Phase II: Triston Score: 9      Anesthesia Post Evaluation    Patient location during evaluation: PACU  Patient participation: complete - patient participated  Level of consciousness: awake and alert  Pain score: 0  Airway patency: patent  Nausea & Vomiting: no nausea and no vomiting  Complications: no  Cardiovascular status: hemodynamically stable  Respiratory status: acceptable  Hydration status: stable

## 2022-08-31 ENCOUNTER — OFFICE VISIT (OUTPATIENT)
Dept: DERMATOLOGY | Age: 52
End: 2022-08-31
Payer: COMMERCIAL

## 2022-08-31 DIAGNOSIS — D22.70 MULTIPLE BENIGN MELANOCYTIC NEVI OF UPPER EXTREMITY, LOWER EXTREMITY, AND TRUNK: ICD-10-CM

## 2022-08-31 DIAGNOSIS — L81.4 LENTIGINES: ICD-10-CM

## 2022-08-31 DIAGNOSIS — D22.60 MULTIPLE BENIGN MELANOCYTIC NEVI OF UPPER EXTREMITY, LOWER EXTREMITY, AND TRUNK: ICD-10-CM

## 2022-08-31 DIAGNOSIS — D48.5 NEOPLASM OF UNCERTAIN BEHAVIOR OF SKIN: Primary | ICD-10-CM

## 2022-08-31 DIAGNOSIS — D22.5 MULTIPLE BENIGN MELANOCYTIC NEVI OF UPPER EXTREMITY, LOWER EXTREMITY, AND TRUNK: ICD-10-CM

## 2022-08-31 DIAGNOSIS — L82.1 OTHER SEBORRHEIC KERATOSIS: ICD-10-CM

## 2022-08-31 PROCEDURE — 11102 TANGNTL BX SKIN SINGLE LES: CPT | Performed by: INTERNAL MEDICINE

## 2022-08-31 PROCEDURE — 99203 OFFICE O/P NEW LOW 30 MIN: CPT | Performed by: INTERNAL MEDICINE

## 2022-08-31 NOTE — PATIENT INSTRUCTIONS
Thank you for visiting [address] [facility] today! Please follow the instructions below as we discussed in clinic:      I'll call you with results from mole within 7-10 days     SUNSCREENS: UVA and UVB PROTECTION    Sunlight consists of two types of light that can cause or worsen most skin problems:    UVA (ultraviolet A)  UVB (ultraviolet B)   Causes brown spots/sun spots Causes skin cancer   Causes wrinkles Causes sunburn   Causes aging Responsible for tanning    Worsens rosacea Strongest in summer    Less variation with seasons Peak hours 10am-2pm   All year round  SPF rates UVB protection    All day strong    No rating system available     Passes through glass and clouds      *Sunscreens that block both UVA and UVB light contain:  Zinc Oxide (should contain at least 5% zinc oxide)  Titanium Dioxide  Parsol or Avobenzone (additives improve stability of Avobenzone)  There are other UVA blocking ingredients but they are not as complete as these three. Tips/Suggestions  1) Always use sunscreens with SPF of 30 or higher  2) Make sure the sunscreen has zinc oxide or other UVA block such as Helioplex or Anthelios in product  3) Remember there is no \"safe UV light:. ..so there is no such thing as a safe suntan. 4) Apply sunscreen daily (even in winter and on cloudy days) and reapply every 2 to 4 hours depending on activity. 5) Approximately one ounce (a shot glass) is necessary to adequately cover the entire body. 6) Approximately one teaspoon is necessary to adequately cover the face    *Face sunscreens we like: EltaMD UV Clear, Cerave AM facial moisturizer, La Roche-Posay  *Body sunscreens we like: Neutrogena, La Roche-Posay, Vanicream sunscreen SPF 35    Remember the best sunscreen is whichever one you will wear every day! Biopsy Wound Care Instructions  Cleanse the wound with mild soapy water daily. Gently dry the area.   Apply Vaseline or petroleum jelly to the wound using a cotton tipped applicator. Cover with a clean bandage. Repeat this process until the biopsy site is healed. You may shower and bathe as usual.   ** Biopsy results generally take around 7 business days to come back. If you have not heard from us by then, please call the office at [phone] between 8AM and 4PM Monday through Friday.

## 2022-08-31 NOTE — PROGRESS NOTES
[facility]  Roberto Stovall MD  [phone]    Date of Visit: 8/31/2022    Juan Liu is a 50 y.o. female who presents for skin lesions. New pt    Chief Complaint:   Chief Complaint   Patient presents with    Skin Exam     Mole back rt shoulder         History of Present Illness:    Concern: Mole on R shoulder  Duration:  Since teen years/childhood  Symptoms: Itchy, bothersome because getting more raised  Previous treatments:  None    Patient denies any other new or changing skin lesions. They would like all of their skin lesions to be evaluated for skin cancer. *Personal history of skin cancer: None  *Family history of skin cancer: [address] brother, sister, and mom     Review of Systems:  Gen: Feels well, good sense of health. Skin: No new or changing moles, no history of keloids or hypertrophic scars. Past Medical History, Family History, Surgical History, Medications and Allergies reviewed. Past Medical History:   Diagnosis Date    CTS (carpal tunnel syndrome)     Pulmonary embolism ([facility] [address] 01/30/2022     Past Surgical History:   Procedure Laterality Date    BUNIONECTOMY  1/30/12    left    COLONOSCOPY N/A 8/24/2022    COLONOSCOPY, POSSIBLE POLYPECTOMY performed by Randell Jesus MD at [address] [place] [address]         No Known Allergies  Outpatient Medications Marked as Taking for the 8/31/22 encounter (Office Visit) with Latonia Love MD   Medication Sig Dispense Refill    psyllium (KONSYL) 28.3 % PACK Take 1 packet by mouth daily      Glucosamine-Chondroit-Vit C-Mn (GLUCOSAMINE 1500 COMPLEX) CAPS Take 1 tablet by mouth      MAGNESIUM PO Take  by mouth daily. Multiple Vitamin (MULTIVITAMIN PO) Take  by mouth daily. LYSINE PO Take  by mouth daily. Social History: Manager at [employer]       Physical Examination   No acute distress. Mood clear/affect appropriate. Alert and oriented.   Mucous membranes moist.  Sclera anicteric. Full body skin exam was conducted to include the scalp, face, lips, lids/conjunctiva, ears, neck, chest, abdomen, back, buttock, right and left hands and forearms, right and left leg and feet and was normal with the following exceptions:   -Pink pedunculated papule on R shoulder  - On trunk and bilateral upper and lower extremities, there are multiple well-circumscribed, homogenous tan to brown macules and papules   - Multiple tan to light brown macules on face, shoulders and arms in a photo-distributed pattern  -Scattered, brown, stuck-on appearing, verrucous papules on trunk and extremities        Assessment and Plan     1. Neoplasm of uncertain behavior of skin  - Ddx irritated IDN vs. Less likely dysplastic nevus  *Shave biopsy procedure note:  -The procedure was discussed in detail. We also reviewed the risks of bleeding, scar, and infection. A consent form was signed by the patient.   -The lesion (s) to be removed on R shoulder was marked with a surgical pen. Alcohol was used to cleanse the site. Local anesthesia was acheived with 1% lidocaine with epinephrine. Shave removal of the lesion was performed down to mid dermis using a razor blade. Hemostasis was achieved with aluminum chloride. The wound was dressed with petrolatum and covered with a bandage. Wound care instructions were reviewed. Specimen (s) sent to pathology. The specimen bottle(s) were appropriately labeled.    -The patient tolerated the procedure well and there were no immediate complications. 2. Multiple benign melanocytic nevi of upper extremity, lower extremity, and trunk  - Benign, reassurance  - Counseled on importance of daily sun protection (Broad spectrum, SPF >30), monthly self skin exams  - Reviewed ABCDEs of melanoma  - Sun screen hand out provided      3. Lentigines  -Benign, reassurance   - Reviewed relationship with sun exposure  - Rec daily sunscreen with SPF 30, broad spectrum      4.  Other seborrheic keratosis  -Benign, reassurance        Return in about 1 year (around 8/31/2023). Note is transcribed using voice recognition software. Inadvertent computerized transcription errors may be present.     Liza Paul MD

## 2022-09-02 DIAGNOSIS — Z01.419 WELL WOMAN EXAM WITH ROUTINE GYNECOLOGICAL EXAM: ICD-10-CM

## 2022-09-02 LAB
A/G RATIO: 1.4 (ref 1.1–2.2)
ALBUMIN SERPL-MCNC: 4 G/DL (ref 3.4–5)
ALP BLD-CCNC: 63 U/L (ref 40–129)
ALT SERPL-CCNC: 8 U/L (ref 10–40)
ANION GAP SERPL CALCULATED.3IONS-SCNC: 8 MMOL/L (ref 3–16)
AST SERPL-CCNC: 13 U/L (ref 15–37)
BILIRUB SERPL-MCNC: 0.3 MG/DL (ref 0–1)
BUN BLDV-MCNC: 14 MG/DL (ref 7–20)
CALCIUM SERPL-MCNC: 8.7 MG/DL (ref 8.3–10.6)
CHLORIDE BLD-SCNC: 104 MMOL/L (ref 99–110)
CHOLESTEROL, FASTING: 253 MG/DL (ref 0–199)
CO2: 27 MMOL/L (ref 21–32)
CREAT SERPL-MCNC: 0.8 MG/DL (ref 0.6–1.1)
GFR AFRICAN AMERICAN: >60
GFR NON-AFRICAN AMERICAN: >60
GLUCOSE BLD-MCNC: 93 MG/DL (ref 70–99)
HDLC SERPL-MCNC: 94 MG/DL (ref 40–60)
LDL CHOLESTEROL CALCULATED: 150 MG/DL
POTASSIUM SERPL-SCNC: 4.3 MMOL/L (ref 3.5–5.1)
SODIUM BLD-SCNC: 139 MMOL/L (ref 136–145)
TOTAL PROTEIN: 6.8 G/DL (ref 6.4–8.2)
TRIGLYCERIDE, FASTING: 43 MG/DL (ref 0–150)
VLDLC SERPL CALC-MCNC: 9 MG/DL

## 2022-09-06 LAB — DERMATOLOGY PATHOLOGY REPORT: NORMAL

## 2022-09-06 NOTE — RESULT ENCOUNTER NOTE
Shilpi Judge, your cholesterol (LDL) is a bit higher this year. This is your bad cholesterol. It went from 107 to 150. Ideally, should be less than 100 if possible. The good news is, your good cholesterol is also high. I would like for you to keep an eye on your diet. Look at heart healthy options when possible (american heart association has a lot of resources for this).  Let us know if you have any questions. - Dr. Yesenia Agudelo

## 2022-09-23 PROBLEM — Z12.11 ENCOUNTER FOR SCREENING COLONOSCOPY: Status: RESOLVED | Noted: 2022-08-24 | Resolved: 2022-09-23

## 2022-11-03 ENCOUNTER — HOSPITAL ENCOUNTER (OUTPATIENT)
Dept: WOMENS IMAGING | Age: 52
Discharge: HOME | End: 2022-11-03

## 2022-11-03 VITALS — WEIGHT: 190 LBS | HEIGHT: 65 IN | BODY MASS INDEX: 31.65 KG/M2

## 2022-11-03 DIAGNOSIS — Z12.31 ENCOUNTER FOR SCREENING MAMMOGRAM FOR MALIGNANT NEOPLASM OF BREAST: ICD-10-CM

## 2022-11-03 PROCEDURE — 77063 BREAST TOMOSYNTHESIS BI: CPT

## 2023-03-01 ENCOUNTER — TELEPHONE (OUTPATIENT)
Dept: PRIMARY CARE CLINIC | Age: 53
End: 2023-03-01

## 2023-03-01 NOTE — TELEPHONE ENCOUNTER
Nurse Triage,  Patient called regarding light chest pain that started yesterday w/ upper back pain while moving in bed and is concerned of pulmonary embolism from past history would like a well check up and for  lungs to be listened too.

## 2023-03-03 ENCOUNTER — OFFICE VISIT (OUTPATIENT)
Dept: PRIMARY CARE CLINIC | Age: 53
End: 2023-03-03
Payer: COMMERCIAL

## 2023-03-03 VITALS
BODY MASS INDEX: 33.08 KG/M2 | HEART RATE: 77 BPM | OXYGEN SATURATION: 97 % | WEIGHT: 198.8 LBS | SYSTOLIC BLOOD PRESSURE: 101 MMHG | DIASTOLIC BLOOD PRESSURE: 67 MMHG | TEMPERATURE: 99 F

## 2023-03-03 DIAGNOSIS — J30.2 SEASONAL ALLERGIC RHINITIS, UNSPECIFIED TRIGGER: Primary | ICD-10-CM

## 2023-03-03 PROCEDURE — 99213 OFFICE O/P EST LOW 20 MIN: CPT | Performed by: FAMILY MEDICINE

## 2023-03-03 RX ORDER — CETIRIZINE HYDROCHLORIDE 10 MG/1
10 TABLET ORAL DAILY
Qty: 30 TABLET | Refills: 2 | Status: SHIPPED | OUTPATIENT
Start: 2023-03-03 | End: 2023-04-02

## 2023-03-03 SDOH — ECONOMIC STABILITY: FOOD INSECURITY: WITHIN THE PAST 12 MONTHS, THE FOOD YOU BOUGHT JUST DIDN'T LAST AND YOU DIDN'T HAVE MONEY TO GET MORE.: NEVER TRUE

## 2023-03-03 SDOH — ECONOMIC STABILITY: FOOD INSECURITY: WITHIN THE PAST 12 MONTHS, YOU WORRIED THAT YOUR FOOD WOULD RUN OUT BEFORE YOU GOT MONEY TO BUY MORE.: NEVER TRUE

## 2023-03-03 SDOH — ECONOMIC STABILITY: HOUSING INSECURITY
IN THE LAST 12 MONTHS, WAS THERE A TIME WHEN YOU DID NOT HAVE A STEADY PLACE TO SLEEP OR SLEPT IN A SHELTER (INCLUDING NOW)?: NO

## 2023-03-03 SDOH — ECONOMIC STABILITY: TRANSPORTATION INSECURITY
IN THE PAST 12 MONTHS, HAS LACK OF TRANSPORTATION KEPT YOU FROM MEETINGS, WORK, OR FROM GETTING THINGS NEEDED FOR DAILY LIVING?: NO

## 2023-03-03 SDOH — ECONOMIC STABILITY: INCOME INSECURITY: HOW HARD IS IT FOR YOU TO PAY FOR THE VERY BASICS LIKE FOOD, HOUSING, MEDICAL CARE, AND HEATING?: NOT VERY HARD

## 2023-03-03 ASSESSMENT — PATIENT HEALTH QUESTIONNAIRE - PHQ9
SUM OF ALL RESPONSES TO PHQ9 QUESTIONS 1 & 2: 0
SUM OF ALL RESPONSES TO PHQ QUESTIONS 1-9: 0
SUM OF ALL RESPONSES TO PHQ QUESTIONS 1-9: 0
1. LITTLE INTEREST OR PLEASURE IN DOING THINGS: 0
2. FEELING DOWN, DEPRESSED OR HOPELESS: 0
SUM OF ALL RESPONSES TO PHQ QUESTIONS 1-9: 0
SUM OF ALL RESPONSES TO PHQ QUESTIONS 1-9: 0

## 2023-03-03 NOTE — PROGRESS NOTES
[address] [facility] [address] [facility]  [address] [place] [address]  Dept: [phone]  Dept Fax: [phone]     3/3/2023      Letha Maloney   1970     Chief Complaint   Patient presents with    Cough       HPI  Pt comes in today for c/o cough. Started 7 weeks ago. No associated symptoms. Was using OTC mucinex or dayquil. Has improved for the most part but still lingering. Started with some pain with deep breathing to a mild severity. Started to get concerned due to h/o post-operative PE 1/2022. She completed 6 months of anti-coagulation and saw hematology with negative hypercoag workup. No data recorded       Prior to Visit Medications    Medication Sig Taking? Authorizing Provider   psyllium (KONSYL) 28.3 % PACK Take 1 packet by mouth daily  Historical Provider, MD   Glucosamine-Chondroit-Vit C-Mn (GLUCOSAMINE 1500 COMPLEX) CAPS Take 1 tablet by mouth  Historical Provider, MD   MAGNESIUM PO Take  by mouth daily. Historical Provider, MD   Multiple Vitamin (MULTIVITAMIN PO) Take  by mouth daily. Historical Provider, MD   LYSINE PO Take  by mouth daily. Historical Provider, MD        Past Medical History:   Diagnosis Date    CTS (carpal tunnel syndrome)     Pulmonary embolism ([facility] [address] 01/30/2022    Post-operative, completed 6 months of anti-coagulation, hypercoag wkup neg        Social History     Tobacco Use    Smoking status: Former     Packs/day: 0.50     Years: 15.00     Pack years: 7.50     Types: Cigarettes     Start date: 8/1/1992     Quit date: 3/24/2007     Years since quitting: 15.9    Smokeless tobacco: Never   Vaping Use    Vaping Use: Never used   Substance Use Topics    Alcohol use: Yes     Comment: Do not drink every week, maybe once a month.     Drug use: Never        Past Surgical History:   Procedure Laterality Date    BUNIONECTOMY  1/30/12    left    COLONOSCOPY N/A 8/24/2022    COLONOSCOPY, POSSIBLE POLYPECTOMY performed by Lorenza Barclay MD at [facility]    WISDOM TOOTH EXTRACTION          No Known Allergies     Family History   Problem Relation Age of Onset    High Blood Pressure Mother         Very low medication    High Cholesterol Mother         Very low medication    Cancer Father 68        Lung cancer        Patient's past medical history, surgical history, family history, medications, and allergies  were all reviewed and updated as appropriate today. Review of Systems   Constitutional:  Negative for fever and unexpected weight change. Respiratory:  Positive for cough. Negative for shortness of breath. Cardiovascular:  Negative for palpitations and leg swelling. /67 (Site: Right Upper Arm)   Pulse 77   Temp 99 °F (37.2 °C)   Wt 198 lb 12.8 oz (90.2 kg)   LMP  (LMP Unknown)   SpO2 97%   BMI 33.08 kg/m²      Physical Exam  Vitals reviewed. Constitutional:       General: She is not in acute distress. Appearance: She is well-developed. She is not ill-appearing or toxic-appearing. HENT:      Head: Normocephalic. Right Ear: Tympanic membrane and external ear normal.      Left Ear: Tympanic membrane and external ear normal.      Nose: No mucosal edema. Right Sinus: No maxillary sinus tenderness or frontal sinus tenderness. Left Sinus: No maxillary sinus tenderness or frontal sinus tenderness. Mouth/Throat:      Mouth: Mucous membranes are moist.      Pharynx: No oropharyngeal exudate or posterior oropharyngeal erythema. Eyes:      General: No scleral icterus. Conjunctiva/sclera: Conjunctivae normal.   Cardiovascular:      Rate and Rhythm: Normal rate and regular rhythm. Heart sounds: No murmur heard. Pulmonary:      Effort: Pulmonary effort is normal. No respiratory distress. Breath sounds: Normal breath sounds. Musculoskeletal:      Cervical back: Neck supple. Right lower leg: No edema. Left lower leg: No edema. Lymphadenopathy:      Cervical: No cervical adenopathy. Skin:     General: Skin is warm and dry. Capillary Refill: Capillary refill takes less than 2 seconds. Neurological:      Mental Status: She is alert and oriented to person, place, and time. Mental status is at baseline. Psychiatric:         Mood and Affect: Mood normal.       Assessment:  Encounter Diagnosis   Name Primary? Seasonal allergic rhinitis, unspecified trigger Yes       Plan:    - Symptoms most consistent with allergic rhinitis. Will start OTC zyrtec. Low suspicion for acute PE. No risk factors at present and hypercoag wkup neg. New Prescriptions    CETIRIZINE (ZYRTEC) 10 MG TABLET    Take 1 tablet by mouth daily        No orders of the defined types were placed in this encounter. Return if symptoms worsen or fail to improve.          [address], DO

## 2023-03-07 ASSESSMENT — ENCOUNTER SYMPTOMS
SHORTNESS OF BREATH: 0
COUGH: 1

## 2024-06-21 ENCOUNTER — OFFICE VISIT (OUTPATIENT)
Dept: PRIMARY CARE CLINIC | Age: 54
End: 2024-06-21
Payer: COMMERCIAL

## 2024-06-21 VITALS
HEIGHT: 65 IN | SYSTOLIC BLOOD PRESSURE: 116 MMHG | OXYGEN SATURATION: 98 % | HEART RATE: 65 BPM | DIASTOLIC BLOOD PRESSURE: 72 MMHG | WEIGHT: 206 LBS | TEMPERATURE: 97.6 F | BODY MASS INDEX: 34.32 KG/M2

## 2024-06-21 DIAGNOSIS — R05.1 ACUTE COUGH: Primary | ICD-10-CM

## 2024-06-21 DIAGNOSIS — J30.2 SEASONAL ALLERGIES: ICD-10-CM

## 2024-06-21 DIAGNOSIS — J02.9 SORE THROAT: ICD-10-CM

## 2024-06-21 PROCEDURE — 99213 OFFICE O/P EST LOW 20 MIN: CPT | Performed by: NURSE PRACTITIONER

## 2024-06-21 RX ORDER — FLUTICASONE PROPIONATE 50 MCG
1 SPRAY, SUSPENSION (ML) NASAL DAILY
Qty: 16 G | Refills: 2 | Status: SHIPPED | OUTPATIENT
Start: 2024-06-21

## 2024-06-21 RX ORDER — CETIRIZINE HYDROCHLORIDE 10 MG/1
10 TABLET ORAL DAILY
Qty: 30 TABLET | Refills: 0 | Status: SHIPPED | OUTPATIENT
Start: 2024-06-21 | End: 2024-06-21

## 2024-06-21 RX ORDER — CETIRIZINE HYDROCHLORIDE 10 MG/1
10 TABLET ORAL DAILY
Qty: 30 TABLET | Refills: 2 | Status: SHIPPED | OUTPATIENT
Start: 2024-06-21

## 2024-06-21 SDOH — ECONOMIC STABILITY: INCOME INSECURITY: HOW HARD IS IT FOR YOU TO PAY FOR THE VERY BASICS LIKE FOOD, HOUSING, MEDICAL CARE, AND HEATING?: NOT VERY HARD

## 2024-06-21 SDOH — ECONOMIC STABILITY: FOOD INSECURITY: WITHIN THE PAST 12 MONTHS, YOU WORRIED THAT YOUR FOOD WOULD RUN OUT BEFORE YOU GOT MONEY TO BUY MORE.: NEVER TRUE

## 2024-06-21 SDOH — ECONOMIC STABILITY: FOOD INSECURITY: WITHIN THE PAST 12 MONTHS, THE FOOD YOU BOUGHT JUST DIDN'T LAST AND YOU DIDN'T HAVE MONEY TO GET MORE.: NEVER TRUE

## 2024-06-21 ASSESSMENT — ENCOUNTER SYMPTOMS
COUGH: 1
RHINORRHEA: 0
WHEEZING: 0
SHORTNESS OF BREATH: 0
SORE THROAT: 1
ABDOMINAL PAIN: 0

## 2024-06-21 ASSESSMENT — PATIENT HEALTH QUESTIONNAIRE - PHQ9
SUM OF ALL RESPONSES TO PHQ QUESTIONS 1-9: 0
2. FEELING DOWN, DEPRESSED OR HOPELESS: NOT AT ALL
2. FEELING DOWN, DEPRESSED OR HOPELESS: NOT AT ALL
SUM OF ALL RESPONSES TO PHQ QUESTIONS 1-9: 0
1. LITTLE INTEREST OR PLEASURE IN DOING THINGS: NOT AT ALL
SUM OF ALL RESPONSES TO PHQ9 QUESTIONS 1 & 2: 0
SUM OF ALL RESPONSES TO PHQ QUESTIONS 1-9: 0
SUM OF ALL RESPONSES TO PHQ QUESTIONS 1-9: 0
1. LITTLE INTEREST OR PLEASURE IN DOING THINGS: NOT AT ALL
SUM OF ALL RESPONSES TO PHQ9 QUESTIONS 1 & 2: 0

## 2024-06-21 NOTE — PROGRESS NOTES
completed 6 months of anti-coagulation, hypercoag wkup neg        Social History     Tobacco Use    Smoking status: Former     Current packs/day: 0.00     Average packs/day: 0.5 packs/day for 15.0 years (7.5 ttl pk-yrs)     Types: Cigarettes     Start date: 1992     Quit date: 3/24/2007     Years since quittin.2    Smokeless tobacco: Never   Vaping Use    Vaping Use: Never used   Substance Use Topics    Alcohol use: Yes     Comment: Do not drink every week, maybe once a month.    Drug use: Never        Past Surgical History:   Procedure Laterality Date    BUNIONECTOMY  12    left    COLONOSCOPY N/A 2022    COLONOSCOPY, POSSIBLE POLYPECTOMY performed by Ramos Patino MD at [facility]COPY    WISDOM TOOTH EXTRACTION          No Known Allergies     Family History   Problem Relation Age of Onset    High Blood Pressure Mother         Very low medication    High Cholesterol Mother         Very low medication    Cancer Father 76        Lung cancer        Patient's past medical history, surgical history, family history, medications, and allergies  were all reviewed and updated as appropriate today.    Review of Systems   Constitutional:  Positive for fatigue. Negative for fever.   HENT:  Positive for postnasal drip and sore throat. Negative for congestion and rhinorrhea.    Respiratory:  Positive for cough. Negative for shortness of breath and wheezing.    Cardiovascular:  Negative for chest pain.   Gastrointestinal:  Negative for abdominal pain.   Genitourinary:  Negative for difficulty urinating.   Musculoskeletal:  Negative for arthralgias and myalgias.   Neurological:  Negative for dizziness and weakness.   Hematological:  Negative for adenopathy.       /72 (Site: Left Upper Arm, Position: Sitting, Cuff Size: Medium Adult)   Pulse 65   Temp 97.6 °F (36.4 °C)   Ht 1.651 m (5' 5\")   Wt 93.4 kg (206 lb)   SpO2 98%   BMI 34.28 kg/m²      Physical Exam  Constitutional:       Appearance: Normal

## 2024-07-19 ENCOUNTER — HOSPITAL ENCOUNTER (OUTPATIENT)
Dept: WOMENS IMAGING | Age: 54
Discharge: HOME | End: 2024-07-19
Payer: COMMERCIAL

## 2024-07-19 VITALS — WEIGHT: 202 LBS | BODY MASS INDEX: 33.66 KG/M2 | HEIGHT: 65 IN

## 2024-07-19 DIAGNOSIS — Z12.31 SCREENING MAMMOGRAM FOR BREAST CANCER: ICD-10-CM

## 2024-07-19 PROCEDURE — 77063 BREAST TOMOSYNTHESIS BI: CPT

## 2024-09-15 DIAGNOSIS — J30.2 SEASONAL ALLERGIES: ICD-10-CM

## 2024-09-16 RX ORDER — CETIRIZINE HYDROCHLORIDE 10 MG/1
10 TABLET ORAL DAILY
Qty: 30 TABLET | Refills: 2 | Status: SHIPPED | OUTPATIENT
Start: 2024-09-16

## 2024-10-11 DIAGNOSIS — J30.2 SEASONAL ALLERGIES: ICD-10-CM

## 2024-10-11 NOTE — TELEPHONE ENCOUNTER
Medication:   Requested Prescriptions     Pending Prescriptions Disp Refills    fluticasone (FLONASE) 50 MCG/ACT nasal spray [Pharmacy Med Name: FLUTICASONE PROP 50 MCG SPRAY]  2     Sig: SPRAY 1 SPRAY INTO EACH NOSTRIL EVERY DAY     Last Filled:  8/17/24    Last appt: 6/21/2024   Next appt: Visit date not found

## 2024-10-14 RX ORDER — FLUTICASONE PROPIONATE 50 MCG
1 SPRAY, SUSPENSION (ML) NASAL DAILY
Qty: 3 EACH | Refills: 0 | Status: SHIPPED | OUTPATIENT
Start: 2024-10-14

## 2024-10-29 ENCOUNTER — LAB (OUTPATIENT)
Dept: PRIMARY CARE CLINIC | Age: 54
End: 2024-10-29
Payer: COMMERCIAL

## 2024-10-29 DIAGNOSIS — Z23 FLU VACCINE NEED: Primary | ICD-10-CM

## 2024-10-29 PROCEDURE — 90661 CCIIV3 VAC ABX FR 0.5 ML IM: CPT | Performed by: FAMILY MEDICINE

## 2024-10-29 PROCEDURE — 90471 IMMUNIZATION ADMIN: CPT | Performed by: FAMILY MEDICINE

## 2025-01-13 DIAGNOSIS — J30.2 SEASONAL ALLERGIES: ICD-10-CM

## 2025-01-13 RX ORDER — FLUTICASONE PROPIONATE 50 MCG
SPRAY, SUSPENSION (ML) NASAL
Qty: 1 EACH | Refills: 0 | Status: SHIPPED | OUTPATIENT
Start: 2025-01-13

## 2025-01-14 ENCOUNTER — OFFICE VISIT (OUTPATIENT)
Dept: PRIMARY CARE CLINIC | Age: 55
End: 2025-01-14
Payer: COMMERCIAL

## 2025-01-14 VITALS
SYSTOLIC BLOOD PRESSURE: 126 MMHG | DIASTOLIC BLOOD PRESSURE: 76 MMHG | BODY MASS INDEX: 34.98 KG/M2 | HEART RATE: 64 BPM | OXYGEN SATURATION: 100 % | WEIGHT: 210.2 LBS

## 2025-01-14 DIAGNOSIS — Z00.00 ROUTINE PHYSICAL EXAMINATION: Primary | ICD-10-CM

## 2025-01-14 DIAGNOSIS — E78.2 MIXED HYPERLIPIDEMIA: ICD-10-CM

## 2025-01-14 DIAGNOSIS — R92.343 EXTREMELY DENSE TISSUE OF BOTH BREASTS ON MAMMOGRAPHY: ICD-10-CM

## 2025-01-14 PROCEDURE — 99396 PREV VISIT EST AGE 40-64: CPT | Performed by: FAMILY MEDICINE

## 2025-01-14 SDOH — ECONOMIC STABILITY: FOOD INSECURITY: WITHIN THE PAST 12 MONTHS, YOU WORRIED THAT YOUR FOOD WOULD RUN OUT BEFORE YOU GOT MONEY TO BUY MORE.: NEVER TRUE

## 2025-01-14 SDOH — ECONOMIC STABILITY: FOOD INSECURITY: WITHIN THE PAST 12 MONTHS, THE FOOD YOU BOUGHT JUST DIDN'T LAST AND YOU DIDN'T HAVE MONEY TO GET MORE.: NEVER TRUE

## 2025-01-14 ASSESSMENT — PATIENT HEALTH QUESTIONNAIRE - PHQ9
SUM OF ALL RESPONSES TO PHQ QUESTIONS 1-9: 0
SUM OF ALL RESPONSES TO PHQ9 QUESTIONS 1 & 2: 0
1. LITTLE INTEREST OR PLEASURE IN DOING THINGS: NOT AT ALL
2. FEELING DOWN, DEPRESSED OR HOPELESS: NOT AT ALL
SUM OF ALL RESPONSES TO PHQ QUESTIONS 1-9: 0

## 2025-01-14 NOTE — PATIENT INSTRUCTIONS
[facility] Locations - No appointment necessary.  ? indicates the location is open Saturdays in addition to Monday through Friday.   Call your preferred location for test preparation, business hours and other information you need.   [facility] accepts all insurances.  CENTRAL  E[place]  [place]    ? [place]   [address]   [address]   [place] [address]    Ph: [phone]  [facility]   [address] [place] [address]    Ph: [phone]   ? [place]   [address]    [place] [address]    Ph: [phone]     [facility]   [address] [place] [address]    Ph: [phone] ? [place]   [address] [place] [address]   Ph: [phone]  ? [facility]   [address] [place] [address]    Ph: [phone]      [place]   [address] [place] [address]   Ph: [phone]     [facility]  [address] [place] [address]   Ph: [phone]    [place]    ? [facility]   [address] [place] [address]    Ph: [phone]  [facility]   [address] [place] [address]   Ph: [phone]  [place]   [address] [place], [place] [address]vd.   [facility] [address]    PH: [phone]    [facility]   [address] [place] [address]    Ph: [phone]  [place]  [address] [place] [address]  Ph: [phone]  [facility]   [address] [place] [address]    Ph: [phone]

## 2025-01-14 NOTE — PROGRESS NOTES
[facility]  [facility] CARE  [address] [place] [address]  Dept: [phone]  Dept Fax: [phone]     1/14/2025      Lisa MITCHEL    1970     Chief Complaint   Patient presents with    Annual Exam       HPI    Pt comes in today for annual physical.     Date of last Cervical Cancer screen (HPV or PAP): 8/2/2022     Date of last Mammogram: 7/19/2024  - risk ~ 20%. Saw high risk at [facility]. Planning for MRI breast this summer and every other year.     Date of last Colonoscopy: 8/24/2022     Menses irregular - Renita-menopause. Has noticed some weight gain.     Wt Readings from Last 5 Encounters:   01/14/25 95.3 kg (210 lb 3.2 oz)   07/19/24 91.6 kg (202 lb)   06/21/24 93.4 kg (206 lb)   03/03/23 90.2 kg (198 lb 12.8 oz)   11/03/22 86.2 kg (190 lb)      BP Readings from Last 5 Encounters:   01/14/25 126/76   06/21/24 116/72   03/03/23 101/67   08/24/22 136/78   08/02/22 106/64         PHQ-9 Total Score: 0 (1/14/2025 10:43 AM)       Prior to Visit Medications    Medication Sig Taking? Authorizing Provider   fluticasone (FLONASE) 50 MCG/ACT nasal spray SPRAY 1 SPRAY BY NASAL ROUTE EVERY DAY Yes Deena Vuong DO   cetirizine (ZYRTEC) 10 MG tablet TAKE 1 TABLET BY MOUTH EVERY DAY Yes Deena Vuong DO   psyllium (KONSYL) 28.3 % PACK Take 1 packet by mouth daily Yes Vazquez Wray MD   Glucosamine-Chondroit-Vit C-Mn (GLUCOSAMINE 1500 COMPLEX) CAPS Take 1 tablet by mouth Yes Vazquez Wray MD   MAGNESIUM PO Take  by mouth daily.   Yes Vazquez Wray MD   Multiple Vitamin (MULTIVITAMIN PO) Take  by mouth daily.   Yes Vazquez Wray MD   LYSINE PO Take  by mouth daily.   Yes Vazquez Wray MD        Past Medical History:   Diagnosis Date    CTS (carpal tunnel syndrome)     Pulmonary embolism ([facility]) 01/30/2022    Post-operative, completed 6 months of anti-coagulation, hypercoag wkup neg        Social History     Tobacco

## 2025-01-15 PROBLEM — E78.2 MIXED HYPERLIPIDEMIA: Status: ACTIVE | Noted: 2025-01-15

## 2025-01-15 PROBLEM — R92.343 EXTREMELY DENSE TISSUE OF BOTH BREASTS ON MAMMOGRAPHY: Status: ACTIVE | Noted: 2025-01-15

## 2025-01-15 PROBLEM — I26.99 BILATERAL PULMONARY EMBOLISM (HCC): Status: RESOLVED | Noted: 2022-01-30 | Resolved: 2025-01-15

## 2025-01-15 ASSESSMENT — ENCOUNTER SYMPTOMS
SHORTNESS OF BREATH: 0
ABDOMINAL PAIN: 0
VOMITING: 0
DIARRHEA: 0
NAUSEA: 0

## 2025-01-20 DIAGNOSIS — Z00.00 ROUTINE PHYSICAL EXAMINATION: ICD-10-CM

## 2025-01-21 LAB
ALBUMIN SERPL-MCNC: 4 G/DL (ref 3.4–5)
ALBUMIN/GLOB SERPL: 1.4 {RATIO} (ref 1.1–2.2)
ALP SERPL-CCNC: 72 U/L (ref 40–129)
ALT SERPL-CCNC: 13 U/L (ref 10–40)
ANION GAP SERPL CALCULATED.3IONS-SCNC: 10 MMOL/L (ref 3–16)
AST SERPL-CCNC: 18 U/L (ref 15–37)
BASOPHILS # BLD: 0.1 K/UL (ref 0–0.2)
BASOPHILS NFR BLD: 1.3 %
BILIRUB SERPL-MCNC: <0.2 MG/DL (ref 0–1)
BUN SERPL-MCNC: 17 MG/DL (ref 7–20)
CALCIUM SERPL-MCNC: 9.1 MG/DL (ref 8.3–10.6)
CHLORIDE SERPL-SCNC: 106 MMOL/L (ref 99–110)
CHOLEST SERPL-MCNC: 236 MG/DL (ref 0–199)
CO2 SERPL-SCNC: 24 MMOL/L (ref 21–32)
CREAT SERPL-MCNC: 0.7 MG/DL (ref 0.6–1.1)
DEPRECATED RDW RBC AUTO: 16.1 % (ref 12.4–15.4)
EOSINOPHIL # BLD: 0.2 K/UL (ref 0–0.6)
EOSINOPHIL NFR BLD: 3.8 %
GFR SERPLBLD CREATININE-BSD FMLA CKD-EPI: >90 ML/MIN/{1.73_M2}
GLUCOSE SERPL-MCNC: 99 MG/DL (ref 70–99)
HCT VFR BLD AUTO: 34.2 % (ref 36–48)
HDLC SERPL-MCNC: 88 MG/DL (ref 40–60)
HGB BLD-MCNC: 11.2 G/DL (ref 12–16)
LDL CHOLESTEROL: 135 MG/DL
LYMPHOCYTES # BLD: 1.8 K/UL (ref 1–5.1)
LYMPHOCYTES NFR BLD: 32.4 %
MCH RBC QN AUTO: 28.4 PG (ref 26–34)
MCHC RBC AUTO-ENTMCNC: 32.8 G/DL (ref 31–36)
MCV RBC AUTO: 86.7 FL (ref 80–100)
MONOCYTES # BLD: 0.4 K/UL (ref 0–1.3)
MONOCYTES NFR BLD: 7.5 %
NEUTROPHILS # BLD: 3 K/UL (ref 1.7–7.7)
NEUTROPHILS NFR BLD: 55 %
PLATELET # BLD AUTO: 332 K/UL (ref 135–450)
PMV BLD AUTO: 9 FL (ref 5–10.5)
POTASSIUM SERPL-SCNC: 4.2 MMOL/L (ref 3.5–5.1)
PROT SERPL-MCNC: 6.8 G/DL (ref 6.4–8.2)
RBC # BLD AUTO: 3.95 M/UL (ref 4–5.2)
SODIUM SERPL-SCNC: 140 MMOL/L (ref 136–145)
TRIGL SERPL-MCNC: 66 MG/DL (ref 0–150)
TSH SERPL DL<=0.005 MIU/L-ACNC: 4.16 UIU/ML (ref 0.27–4.2)
VLDLC SERPL CALC-MCNC: 13 MG/DL
WBC # BLD AUTO: 5.4 K/UL (ref 4–11)

## 2025-08-06 ENCOUNTER — HOSPITAL ENCOUNTER (OUTPATIENT)
Dept: MAMMOGRAPHY | Age: 55
Discharge: HOME | End: 2025-08-06
Payer: COMMERCIAL

## 2025-08-06 VITALS — HEIGHT: 65 IN | BODY MASS INDEX: 34.16 KG/M2 | WEIGHT: 205 LBS

## 2025-08-06 DIAGNOSIS — Z12.31 VISIT FOR SCREENING MAMMOGRAM: ICD-10-CM

## 2025-08-06 PROCEDURE — 77063 BREAST TOMOSYNTHESIS BI: CPT

## (undated) DEVICE — CANNULA SAMP CO2 AD GRN 7FT CO2 AND 7FT O2 TBNG UNIV CONN